# Patient Record
Sex: FEMALE | Race: WHITE | HISPANIC OR LATINO | URBAN - METROPOLITAN AREA
[De-identification: names, ages, dates, MRNs, and addresses within clinical notes are randomized per-mention and may not be internally consistent; named-entity substitution may affect disease eponyms.]

---

## 2018-11-11 ENCOUNTER — INPATIENT (INPATIENT)
Facility: HOSPITAL | Age: 83
LOS: 3 days | Discharge: HOME | DRG: 312 | End: 2018-11-15
Attending: INTERNAL MEDICINE | Admitting: INTERNAL MEDICINE
Payer: MEDICARE

## 2018-11-11 VITALS — WEIGHT: 130.07 LBS | HEIGHT: 58 IN
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
## 2018-11-11 LAB
ALBUMIN SERPL ELPH-MCNC: 3.8 G/DL — SIGNIFICANT CHANGE UP (ref 3.5–5)
ALP SERPL-CCNC: 103 U/L — SIGNIFICANT CHANGE UP (ref 40–120)
ALT FLD-CCNC: 22 U/L DA — SIGNIFICANT CHANGE UP (ref 10–60)
ANION GAP SERPL CALC-SCNC: 10 MMOL/L — SIGNIFICANT CHANGE UP (ref 5–17)
APTT BLD: 27.7 SEC — SIGNIFICANT CHANGE UP (ref 27.5–36.3)
AST SERPL-CCNC: 35 U/L — SIGNIFICANT CHANGE UP (ref 10–40)
BASOPHILS # BLD AUTO: 0.1 K/UL — SIGNIFICANT CHANGE UP (ref 0–0.2)
BASOPHILS NFR BLD AUTO: 0.7 % — SIGNIFICANT CHANGE UP (ref 0–2)
BILIRUB SERPL-MCNC: 0.2 MG/DL — SIGNIFICANT CHANGE UP (ref 0.2–1.2)
BUN SERPL-MCNC: 21 MG/DL — HIGH (ref 7–18)
CALCIUM SERPL-MCNC: 9.4 MG/DL — SIGNIFICANT CHANGE UP (ref 8.4–10.5)
CHLORIDE SERPL-SCNC: 104 MMOL/L — SIGNIFICANT CHANGE UP (ref 96–108)
CK SERPL-CCNC: 267 U/L — HIGH (ref 21–215)
CO2 SERPL-SCNC: 23 MMOL/L — SIGNIFICANT CHANGE UP (ref 22–31)
CREAT SERPL-MCNC: 1.03 MG/DL — SIGNIFICANT CHANGE UP (ref 0.5–1.3)
EOSINOPHIL # BLD AUTO: 0.2 K/UL — SIGNIFICANT CHANGE UP (ref 0–0.5)
EOSINOPHIL NFR BLD AUTO: 2 % — SIGNIFICANT CHANGE UP (ref 0–6)
GLUCOSE SERPL-MCNC: 106 MG/DL — HIGH (ref 70–99)
HCT VFR BLD CALC: 39.1 % — SIGNIFICANT CHANGE UP (ref 34.5–45)
HGB BLD-MCNC: 13.2 G/DL — SIGNIFICANT CHANGE UP (ref 11.5–15.5)
INR BLD: 1.05 RATIO — SIGNIFICANT CHANGE UP (ref 0.88–1.16)
LYMPHOCYTES # BLD AUTO: 1.7 K/UL — SIGNIFICANT CHANGE UP (ref 1–3.3)
LYMPHOCYTES # BLD AUTO: 18.1 % — SIGNIFICANT CHANGE UP (ref 13–44)
MAGNESIUM SERPL-MCNC: 2.4 MG/DL — SIGNIFICANT CHANGE UP (ref 1.6–2.6)
MCHC RBC-ENTMCNC: 30.6 PG — SIGNIFICANT CHANGE UP (ref 27–34)
MCHC RBC-ENTMCNC: 33.8 GM/DL — SIGNIFICANT CHANGE UP (ref 32–36)
MCV RBC AUTO: 90.4 FL — SIGNIFICANT CHANGE UP (ref 80–100)
MONOCYTES # BLD AUTO: 0.8 K/UL — SIGNIFICANT CHANGE UP (ref 0–0.9)
MONOCYTES NFR BLD AUTO: 8.7 % — SIGNIFICANT CHANGE UP (ref 2–14)
NEUTROPHILS # BLD AUTO: 6.6 K/UL — SIGNIFICANT CHANGE UP (ref 1.8–7.4)
NEUTROPHILS NFR BLD AUTO: 70.5 % — SIGNIFICANT CHANGE UP (ref 43–77)
PLATELET # BLD AUTO: 291 K/UL — SIGNIFICANT CHANGE UP (ref 150–400)
POTASSIUM SERPL-MCNC: 4.9 MMOL/L — SIGNIFICANT CHANGE UP (ref 3.5–5.3)
POTASSIUM SERPL-SCNC: 4.9 MMOL/L — SIGNIFICANT CHANGE UP (ref 3.5–5.3)
PROT SERPL-MCNC: 9.3 G/DL — HIGH (ref 6–8.3)
PROTHROM AB SERPL-ACNC: 11.7 SEC — SIGNIFICANT CHANGE UP (ref 10–12.9)
RBC # BLD: 4.33 M/UL — SIGNIFICANT CHANGE UP (ref 3.8–5.2)
RBC # FLD: 12.5 % — SIGNIFICANT CHANGE UP (ref 10.3–14.5)
SODIUM SERPL-SCNC: 137 MMOL/L — SIGNIFICANT CHANGE UP (ref 135–145)
TROPONIN I SERPL-MCNC: <0.015 NG/ML — SIGNIFICANT CHANGE UP (ref 0–0.04)
WBC # BLD: 9.4 K/UL — SIGNIFICANT CHANGE UP (ref 3.8–10.5)
WBC # FLD AUTO: 9.4 K/UL — SIGNIFICANT CHANGE UP (ref 3.8–10.5)

## 2018-11-11 PROCEDURE — 71045 X-RAY EXAM CHEST 1 VIEW: CPT | Mod: 26

## 2018-11-11 PROCEDURE — 72131 CT LUMBAR SPINE W/O DYE: CPT | Mod: 26

## 2018-11-11 PROCEDURE — 72128 CT CHEST SPINE W/O DYE: CPT | Mod: 26

## 2018-11-11 PROCEDURE — 70450 CT HEAD/BRAIN W/O DYE: CPT | Mod: 26

## 2018-11-11 PROCEDURE — 99285 EMERGENCY DEPT VISIT HI MDM: CPT

## 2018-11-11 PROCEDURE — 99223 1ST HOSP IP/OBS HIGH 75: CPT

## 2018-11-11 RX ORDER — IBUPROFEN 200 MG
600 TABLET ORAL ONCE
Qty: 0 | Refills: 0 | Status: COMPLETED | OUTPATIENT
Start: 2018-11-11 | End: 2018-11-11

## 2018-11-11 RX ORDER — KETOROLAC TROMETHAMINE 30 MG/ML
30 SYRINGE (ML) INJECTION ONCE
Qty: 0 | Refills: 0 | Status: DISCONTINUED | OUTPATIENT
Start: 2018-11-11 | End: 2018-11-11

## 2018-11-11 RX ADMIN — Medication 600 MILLIGRAM(S): at 20:48

## 2018-11-11 NOTE — ED PROVIDER NOTE - PHYSICAL EXAMINATION
reproducible midline thoracic and lumbar tenderness to palpation. R paraspinal lumbar and thoracic tenderness to palpation.

## 2018-11-11 NOTE — ED PROVIDER NOTE - CHPI ED SYMPTOMS NEG
no loss of consciousness/no fever, no diaphoresis, no dizziness, no lightheadedness, no changes in vision./no weakness/no vomiting

## 2018-11-11 NOTE — ED PROVIDER NOTE - OBJECTIVE STATEMENT
89 y/o F pt with PMHx of Osteoporosis and HTN, presents to ED s/p fall from standing height 1 day ago. Pt reports that she was walking to throw food out into the garbage when she fell. She remembers the fall and denies feeling lightheaded or dizziness beforehand but is unsure of what caused the fall. Pt denies tripping or any weakness. She was down on the floor for 6-7 minutes at which time pt felt short of breath, which was self limited. Pt denies chest pain, changes in vision, nausea, or vomiting. Pt is only c/o worsening back pain today. She was prompted to come to ED by friends. Pt did not experience any other episodes of shortness of breath until she was in CT scan which was once again self limited. No other acute complaints at this time. Pt is allergic to penicillin.

## 2018-11-11 NOTE — ED ADULT NURSE NOTE - NSIMPLEMENTINTERV_GEN_ALL_ED
Implemented All Universal Safety Interventions:  [place] to call system. Call bell, personal items and telephone within reach. Instruct patient to call for assistance. Room bathroom lighting operational. Non-slip footwear when patient is off stretcher. Physically safe environment: no spills, clutter or unnecessary equipment. Stretcher in lowest position, wheels locked, appropriate side rails in place. Implemented All Fall with Harm Risk Interventions:  [place] to call system. Call bell, personal items and telephone within reach. Instruct patient to call for assistance. Room bathroom lighting operational. Non-slip footwear when patient is off stretcher. Physically safe environment: no spills, clutter or unnecessary equipment. Stretcher in lowest position, wheels locked, appropriate side rails in place. Provide visual cue, wrist band, yellow gown, etc. Monitor gait and stability. Monitor for mental status changes and reorient to person, place, and time. Review medications for side effects contributing to fall risk. Reinforce activity limits and safety measures with patient and family. Provide visual clues: red socks.

## 2018-11-11 NOTE — ED PROVIDER NOTE - MEDICAL DECISION MAKING DETAILS
89 y/o F pt presents s/p fall of unknown mechanism. Will obtain cardiac workup, CT head, neck, thoracic and lumbar spine then reassess.

## 2018-11-11 NOTE — ED STATDOCS - OBJECTIVE STATEMENT
Telemedicine assessment was conducted (using real time 2 way audio-video technology) by Dr. Spenser Spaulding located at [address] [place] [address]  +++++++++++++++++++++++++++++++++++++++++++++++++++  History and Plan: Patient says she was bringing something to the garbage when she suddenly "went down" to the floor. Patient states she is beginning to have right hip pain and mid-lower back pain. Patient says she had difficulty breathing s/p fall. Patient notes a history of problems with her balance. Telemedicine assessment was conducted (using real time 2 way audio-video technology) by Dr. Spenser Spaulding located at [address] [place] [id number]  +++++++++++++++++++++++++++++++++++++++++++++++++++  History and Plan: Patient says she was bringing something to the garbage when she suddenly "went down" to the floor. Patient states she is beginning to have right hip pain and mid-lower back pain. Patient says she had difficulty breathing s/p fall. Patient notes a history of problems with her balance.    dizziness/syncope with fall  ekg, labs, ct    Patient seen by me in intake for initial assessment and ordering. Physician on site to follow results and further evaluate and treat patient.

## 2018-11-11 NOTE — ED PROVIDER NOTE - PROGRESS NOTE DETAILS
Imaging shows 3 compression fractures of indeterminate age. Pt was told of imaging results and believes that these are old fractures due to medical hx. Will admit due to unknown mechanism of fall, consulted with Dr. Barraza.

## 2018-11-12 DIAGNOSIS — Z29.9 ENCOUNTER FOR PROPHYLACTIC MEASURES, UNSPECIFIED: ICD-10-CM

## 2018-11-12 DIAGNOSIS — R07.89 OTHER CHEST PAIN: ICD-10-CM

## 2018-11-12 DIAGNOSIS — M81.0 AGE-RELATED OSTEOPOROSIS WITHOUT CURRENT PATHOLOGICAL FRACTURE: ICD-10-CM

## 2018-11-12 DIAGNOSIS — E78.89 OTHER LIPOPROTEIN METABOLISM DISORDERS: ICD-10-CM

## 2018-11-12 DIAGNOSIS — Z98.890 OTHER SPECIFIED POSTPROCEDURAL STATES: Chronic | ICD-10-CM

## 2018-11-12 DIAGNOSIS — K21.9 GASTRO-ESOPHAGEAL REFLUX DISEASE WITHOUT ESOPHAGITIS: ICD-10-CM

## 2018-11-12 DIAGNOSIS — R55 SYNCOPE AND COLLAPSE: ICD-10-CM
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
## 2018-11-12 DIAGNOSIS — M54.9 DORSALGIA, UNSPECIFIED: ICD-10-CM

## 2018-11-12 DIAGNOSIS — M54.6 PAIN IN THORACIC SPINE: ICD-10-CM

## 2018-11-12 DIAGNOSIS — I10 ESSENTIAL (PRIMARY) HYPERTENSION: ICD-10-CM

## 2018-11-12 LAB
CREATININE, URINE RESULT: 22 MG/DL — SIGNIFICANT CHANGE UP
PROT ?TM UR-MCNC: 4 MG/DL — SIGNIFICANT CHANGE UP (ref 0–12)

## 2018-11-12 PROCEDURE — 99222 1ST HOSP IP/OBS MODERATE 55: CPT

## 2018-11-12 PROCEDURE — 93880 EXTRACRANIAL BILAT STUDY: CPT | Mod: 26

## 2018-11-12 PROCEDURE — 99233 SBSQ HOSP IP/OBS HIGH 50: CPT | Mod: GC

## 2018-11-12 PROCEDURE — 99223 1ST HOSP IP/OBS HIGH 75: CPT

## 2018-11-12 RX ORDER — METOPROLOL TARTRATE 50 MG
12.5 TABLET ORAL
Qty: 0 | Refills: 0 | Status: DISCONTINUED | OUTPATIENT
Start: 2018-11-12 | End: 2018-11-13

## 2018-11-12 RX ORDER — LIDOCAINE 4 G/100G
1 CREAM TOPICAL DAILY
Qty: 0 | Refills: 0 | Status: DISCONTINUED | OUTPATIENT
Start: 2018-11-12 | End: 2018-11-15

## 2018-11-12 RX ORDER — HEPARIN SODIUM 5000 [USP'U]/ML
INJECTION INTRAVENOUS; SUBCUTANEOUS
Qty: 25000 | Refills: 0 | Status: DISCONTINUED | OUTPATIENT
Start: 2018-11-12 | End: 2018-11-13

## 2018-11-12 RX ORDER — AMLODIPINE BESYLATE 2.5 MG/1
5 TABLET ORAL DAILY
Qty: 0 | Refills: 0 | Status: DISCONTINUED | OUTPATIENT
Start: 2018-11-12 | End: 2018-11-15

## 2018-11-12 RX ORDER — AMLODIPINE BESYLATE 2.5 MG/1
1 TABLET ORAL
Qty: 0 | Refills: 0 | COMMUNITY

## 2018-11-12 RX ORDER — LIDOCAINE 4 G/100G
1 CREAM TOPICAL DAILY
Qty: 0 | Refills: 0 | Status: DISCONTINUED | OUTPATIENT
Start: 2018-11-12 | End: 2018-11-12

## 2018-11-12 RX ORDER — ACETAMINOPHEN 500 MG
650 TABLET ORAL ONCE
Qty: 0 | Refills: 0 | Status: COMPLETED | OUTPATIENT
Start: 2018-11-12 | End: 2018-11-12

## 2018-11-12 RX ORDER — ATORVASTATIN CALCIUM 80 MG/1
40 TABLET, FILM COATED ORAL AT BEDTIME
Qty: 0 | Refills: 0 | Status: DISCONTINUED | OUTPATIENT
Start: 2018-11-12 | End: 2018-11-12

## 2018-11-12 RX ORDER — ACETAMINOPHEN 500 MG
650 TABLET ORAL EVERY 6 HOURS
Qty: 0 | Refills: 0 | Status: DISCONTINUED | OUTPATIENT
Start: 2018-11-12 | End: 2018-11-14

## 2018-11-12 RX ORDER — HEPARIN SODIUM 5000 [USP'U]/ML
4500 INJECTION INTRAVENOUS; SUBCUTANEOUS ONCE
Qty: 0 | Refills: 0 | Status: DISCONTINUED | OUTPATIENT
Start: 2018-11-12 | End: 2018-11-12

## 2018-11-12 RX ORDER — HEPARIN SODIUM 5000 [USP'U]/ML
2000 INJECTION INTRAVENOUS; SUBCUTANEOUS EVERY 6 HOURS
Qty: 0 | Refills: 0 | Status: DISCONTINUED | OUTPATIENT
Start: 2018-11-12 | End: 2018-11-12

## 2018-11-12 RX ORDER — PANTOPRAZOLE SODIUM 20 MG/1
40 TABLET, DELAYED RELEASE ORAL
Qty: 0 | Refills: 0 | Status: DISCONTINUED | OUTPATIENT
Start: 2018-11-12 | End: 2018-11-15

## 2018-11-12 RX ORDER — GABAPENTIN 400 MG/1
100 CAPSULE ORAL AT BEDTIME
Qty: 0 | Refills: 0 | Status: DISCONTINUED | OUTPATIENT
Start: 2018-11-12 | End: 2018-11-15

## 2018-11-12 RX ORDER — METOPROLOL TARTRATE 50 MG
25 TABLET ORAL
Qty: 0 | Refills: 0 | Status: DISCONTINUED | OUTPATIENT
Start: 2018-11-12 | End: 2018-11-12

## 2018-11-12 RX ORDER — HEPARIN SODIUM 5000 [USP'U]/ML
4500 INJECTION INTRAVENOUS; SUBCUTANEOUS EVERY 6 HOURS
Qty: 0 | Refills: 0 | Status: DISCONTINUED | OUTPATIENT
Start: 2018-11-12 | End: 2018-11-12

## 2018-11-12 RX ORDER — ENOXAPARIN SODIUM 100 MG/ML
40 INJECTION SUBCUTANEOUS DAILY
Qty: 0 | Refills: 0 | Status: DISCONTINUED | OUTPATIENT
Start: 2018-11-12 | End: 2018-11-12

## 2018-11-12 RX ORDER — ACETAMINOPHEN 500 MG
650 TABLET ORAL EVERY 6 HOURS
Qty: 0 | Refills: 0 | Status: DISCONTINUED | OUTPATIENT
Start: 2018-11-12 | End: 2018-11-12

## 2018-11-12 RX ORDER — HEPARIN SODIUM 5000 [USP'U]/ML
INJECTION INTRAVENOUS; SUBCUTANEOUS
Qty: 25000 | Refills: 0 | Status: DISCONTINUED | OUTPATIENT
Start: 2018-11-12 | End: 2018-11-12

## 2018-11-12 RX ORDER — ASPIRIN/CALCIUM CARB/MAGNESIUM 324 MG
81 TABLET ORAL DAILY
Qty: 0 | Refills: 0 | Status: DISCONTINUED | OUTPATIENT
Start: 2018-11-12 | End: 2018-11-15

## 2018-11-12 RX ADMIN — Medication 650 MILLIGRAM(S): at 23:01

## 2018-11-12 RX ADMIN — Medication 81 MILLIGRAM(S): at 11:17

## 2018-11-12 RX ADMIN — LIDOCAINE 1 PATCH: 4 CREAM TOPICAL at 17:54

## 2018-11-12 RX ADMIN — ENOXAPARIN SODIUM 40 MILLIGRAM(S): 100 INJECTION SUBCUTANEOUS at 11:17

## 2018-11-12 RX ADMIN — PANTOPRAZOLE SODIUM 40 MILLIGRAM(S): 20 TABLET, DELAYED RELEASE ORAL at 05:23

## 2018-11-12 RX ADMIN — Medication 650 MILLIGRAM(S): at 16:40

## 2018-11-12 RX ADMIN — LIDOCAINE 1 PATCH: 4 CREAM TOPICAL at 11:19

## 2018-11-12 RX ADMIN — Medication 25 MILLIGRAM(S): at 05:23

## 2018-11-12 RX ADMIN — Medication 600 MILLIGRAM(S): at 01:20

## 2018-11-12 RX ADMIN — LIDOCAINE 1 PATCH: 4 CREAM TOPICAL at 06:08

## 2018-11-12 RX ADMIN — Medication 650 MILLIGRAM(S): at 16:10

## 2018-11-12 RX ADMIN — Medication 12.5 MILLIGRAM(S): at 17:54

## 2018-11-12 RX ADMIN — AMLODIPINE BESYLATE 5 MILLIGRAM(S): 2.5 TABLET ORAL at 05:23

## 2018-11-12 RX ADMIN — Medication 650 MILLIGRAM(S): at 08:57

## 2018-11-12 RX ADMIN — Medication 650 MILLIGRAM(S): at 09:23

## 2018-11-12 NOTE — CONSULT NOTE ADULT - ASSESSMENT
89 yo F with HTN who presented after a fall     1. Fall:    2. Chest pain: -Patient reports this is similar to her gastritis; she furthermore has negative cardiac enzymes and a normal EKG  -Check echocardiogram  -Consider outpatient stress test if symptoms recur after trial of PPI     3. HTN: Patient is on amlodipine 5 mg  -Was on valsartan at home, which has been changed to metoprolol here  *No indication for metoprolol unless EF is depressed on echocardiogram; would discontinue for now and switch patient to losartan instead of her valsartan (can start at 50mg once daily and increase to 100mg daily as needed)    4. HLD: Patient is out of benefit range of statin given her age, would discontinue    ***Note that this is a preliminary note and any recommendations should NOT be carried out until this note is finalized. *** 88 yo F with HTN who presented after a fall     1. Fall: Sounds mechanical in nature given patient's description. If further episodes, can see cardiologist for re-evaluation.     2. Chest pain: -Patient reports this is similar to her gastritis; she furthermore has negative cardiac enzymes and a normal EKG  -Check echocardiogram  -Consider outpatient stress test if symptoms recur after trial of PPI     3. HTN: Patient is on amlodipine 5 mg  -Was on valsartan at home, which has been changed to metoprolol here  *No indication for metoprolol unless EF is depressed on echocardiogram; would discontinue for now and switch patient to losartan instead of her valsartan (can start at 50mg once daily and increase to 100mg daily as needed)    4. HLD: Patient is out of benefit range of statin given her age, would discontinue

## 2018-11-12 NOTE — H&P ADULT - PROBLEM SELECTOR PLAN 2
atypical chest pain most likely from GERD, intermediate risk for ACS   will r/o ACS  c/w aspirin, statin and BB  EKG as above  troponin 1 negative  f/u t2 t3  f/u echo  f/u cardiology Atypical chest pain most likely from GERD, intermediate risk for ACS   will r/o ACS  c/w aspirin, statin and BB  EKG as above  troponin 1 negative  f/u t2 t3  f/u echo  f/u cardiology

## 2018-11-12 NOTE — H&P ADULT - PROBLEM SELECTOR PLAN 3
Pt is not valsartan 320mg and amlodipine 5mg at home  Will hold the valsartan for now as its not available in the pharmacy   will start metoprolol 12.5mg BID   c/w Amlodipine 5mg OD  c/w dash diet  f/u lipid profile

## 2018-11-12 NOTE — PHYSICAL THERAPY INITIAL EVALUATION ADULT - CRITERIA FOR SKILLED THERAPEUTIC INTERVENTIONS
predicted duration of therapy intervention/risk reduction/prevention/therapy frequency/anticipated equipment needs at discharge/impairments found/functional limitations in following categories/anticipated discharge recommendation/rehab potential

## 2018-11-12 NOTE — PHYSICAL THERAPY INITIAL EVALUATION ADULT - IMPAIRMENTS CONTRIBUTING TO GAIT DEVIATIONS, PT EVAL
decreased strength/decreased ROM/impaired balance/impaired postural control/dizziness which got better but did not completely subside/pain

## 2018-11-12 NOTE — PHYSICAL THERAPY INITIAL EVALUATION ADULT - GENERAL OBSERVATIONS, REHAB EVAL
Consult received, chart reviewed. Patient received seated in chair, NAD, + tele. Patient agreed to EVALUATION from Physical Therapist.

## 2018-11-12 NOTE — PROGRESS NOTE ADULT - PROBLEM SELECTOR PLAN 5
Compression fracture of T9, T10 and L2.  Riddhi on board for pain management  f/u vit D  out pt follow up with PCP for osteoporosis Compression fracture of T9, T10 and L2.  Pain management consultation, appreciated.    f/u vit D  out pt follow up with PCP for osteoporosis

## 2018-11-12 NOTE — H&P ADULT - HISTORY OF PRESENT ILLNESS
89 year old female from home lives alone walks independently with PMHx of HTN, Osteoporosis, Esophagitis presented to ED because of unwitnessed fall after having near syncope episode on november 10th in the morning, pt was in the dinning room and was going to throw the garbage suddenly froze she started feeling dizzy and she lost her balance and fell down on her back, hurt her back (she developed back pain form after the injury), started feeling SOB and the SOB episode lasted for around 5-7 minutes,  no loss of consciousness or head trauma, pt was aware of the entire incident. Pt acknowledges she did not eat anything in the morning. Pt denies feeling numbness, weakness, tingling, chest pain, palpitation, or going for urination or defecation before the episode. Pt states she drinks lots of fluids and she had similar episode one month back but she did not fell down at that time. Pt also states sometimes she feels chest pain around epigastric are which she thinks from esophagitis.    In the ED vitals are stable, EKG: NSR, septal infract age undermined with 78 BPM, Had CBC and BMP grossly unremarkable except for elevated protein. Pt had CT head negative for acute stroke and also had CT lumbar and spine which showed 1) Age indeterminate compression fracture deformities of the T9, T10 and L2 vertebral bodies. 2) Indeterminate exophytic slightly high attenuation left renal midpole lesion which could represent a hemorrhagic cyst versus a renal mass. A nonemergent renal ultrasound is recommended for further evaluation. 89 year old female from home lives alone walks independently with PMHx of HTN, Osteoporosis, Esophagitis presented to ED because of unwitnessed fall after having near syncope episode on november 10th in the morning, pt was in the dinning room and was going to throw the garbage suddenly froze she started feeling dizzy and she lost her balance and fell down on her back, hurt her back (she developed back pain form after the injury), started feeling SOB and the SOB episode lasted for around 5-7 minutes,  no loss of consciousness or head trauma, pt was aware of the entire incident. Pt acknowledges she did not eat anything in the morning. Pt denies feeling numbness, weakness, tingling, chest pain, palpitation, or going for urination or defecation before the episode. Pt states she drinks lots of fluids and she had similar episode one month back but she did not fell down at that time. Pt also states sometimes she feels chest pain around epigastric are which she thinks from esophagitis.    In the ED vitals are stable, EKG: NSR, septal infract age undermined with 78 BPM, Had CBC and BMP grossly unremarkable except for elevated protein. Pt had CT head negative for acute stroke and also had CT lumbar and spine which showed 1) Age indeterminate compression fracture deformities of the T9, T10 and L2 vertebral bodies. 2) Indeterminate exophytic slightly high attenuation left renal midpole lesion which could represent a hemorrhagic cyst versus a renal mass. A nonemergent renal ultrasound is recommended for further evaluation.    Code status: Pt has healthcare proxy : DAKOTA EVANS 89 year old female from home lives alone walks independently with PMHx of HTN, Osteoporosis, Esophagitis presented to ED because of unwitnessed fall after having near syncope episode on november 10th in the morning, pt was in the dinning room and was going to throw the garbage suddenly froze she started feeling dizzy and she lost her balance and fell down on her back, hurt her back (she developed back pain form after the injury, 5/10, sore pain, aggravated by movement), started feeling SOB and the SOB episode lasted for around 5-7 minutes,  no loss of consciousness or head trauma, pt was aware of the entire incident. Pt acknowledges she did not eat anything in the morning. Pt denies feeling numbness, weakness, tingling, chest pain, palpitation, or going for urination or defecation before the episode. Pt states she drinks lots of fluids and she had similar episode one month back but she did not fell down at that time.  Pt also states sometimes she feels chest pain around epigastric region which she thinks from esophagitis, burning type of pain, 7/10 on intensity, non radiating, aggravated by food, not associated with exercise.     In the ED vitals are stable, EKG: NSR, septal infract age undermined with 78 BPM, Had CBC and BMP grossly unremarkable except for elevated protein. Pt had CT head negative for acute stroke and also had CT lumbar and spine which showed 1) Age indeterminate compression fracture deformities of the T9, T10 and L2 vertebral bodies. 2) Indeterminate exophytic slightly high attenuation left renal midpole lesion which could represent a hemorrhagic cyst versus a renal mass. A nonemergent renal ultrasound is recommended for further evaluation.    Code status: Pt has healthcare proxy : DAKOTA EVANS

## 2018-11-12 NOTE — CONSULT NOTE ADULT - PROBLEM SELECTOR RECOMMENDATION 9
+compression fracture deformities of the T9, T10 and L2 vertebral bodies hx of osteoporosis   - tylenol 650 mg q 6 hrs atc   - lidocaine patch daily  - add gabapentin 100 mg at bedtime, will titrate up   - OOB, ambulate

## 2018-11-12 NOTE — PROGRESS NOTE ADULT - PROBLEM SELECTOR PLAN 2
Atypical chest pain most likely from GERD, intermediate risk for ACS   will r/o ACS  c/w aspirin, statin and BB  f/u echo Atypical chest pain most likely from GERD, intermediate risk for ACS   will r/o ACS  c/w aspirin, will discontinue statin and beta blocker  f/u echo

## 2018-11-12 NOTE — CONSULT NOTE ADULT - SUBJECTIVE AND OBJECTIVE BOX
NP Note   Pain Management     89 year old female from home lives alone walks independently with PMHx of HTN, Osteoporosis, Esophagitis presented to ED because of unwitnessed fall after having near syncope episode on november 10th in the morning, pt was in the dinning room and was going to throw the garbage suddenly froze she started feeling dizzy and she lost her balance and fell down on her back, hurt her back (she developed back pain form after the injury, 5/10, sore pain, aggravated by movement), started feeling SOB and the SOB episode lasted for around 5-7 minutes,  no loss of consciousness or head trauma, pt was aware of the entire incident. Pt acknowledges she did not eat anything in the morning. Pt denies feeling numbness, weakness, tingling, chest pain, palpitation, or going for urination or defecation before the episode. Pt states she drinks lots of fluids and she had similar episode one month back but she did not fell down at that time.  Pt also states sometimes she feels chest pain around epigastric region which she thinks from esophagitis, burning type of pain, 7/10 on intensity, non radiating, aggravated by food, not associated with exercise.     In the ED vitals are stable, EKG: NSR, septal infract age undermined with 78 BPM, Had CBC and BMP grossly unremarkable except for elevated protein. Pt had CT head negative for acute stroke and also had CT lumbar and spine which showed 1) Age indeterminate compression fracture deformities of the T9, T10 and L2 vertebral bodies. 2) Indeterminate exophytic slightly high attenuation left renal midpole lesion which could represent a hemorrhagic cyst versus a renal mass. A nonemergent renal ultrasound is recommended for further evaluation.    Patient is 88 y/o female with pmhx of osteoporosis, gastritis, esophagitis and HTN presented sp fall admitted to telemetry for evaluation.  CT scan of thoraci and lumbar spine showed age indeterminate compression fracture deformities of the T9, T10 and L2 vertebral bodies. Patient seen at bedside sitting in chair, eating lunch. Pt co mid back pain. States 2 days ago likely lost balance and fell backwards, landed on her back. States was able to get up and and sit aon the chair. Reports pain developed immediately after fall. reports pain as dull, throbbing, 6/10 in intensity. States took tylenol with relief. Pt reports hx of chronic back pain for the past 2-3 years usually relieved with tylenol. Also reports she is very active and exercises 20 min every day. States was advised not to take NSAIDs because of chronic esophagitis/gastritis. Also stopped bisphosphonates because of side effects.    Pt denies numbness, tingling, focal weakness, bowel or bladder habits changes.             INTERVAL HPI/OVERNIGHT EVENTS: no new complaints    MEDICATIONS  (STANDING):  amLODIPine   Tablet 5 milliGRAM(s) Oral daily  aspirin enteric coated 81 milliGRAM(s) Oral daily  atorvastatin 40 milliGRAM(s) Oral at bedtime  enoxaparin Injectable 40 milliGRAM(s) SubCutaneous daily  lidocaine   Patch 1 Patch Transdermal daily  metoprolol tartrate 12.5 milliGRAM(s) Oral two times a day  pantoprazole    Tablet 40 milliGRAM(s) Oral before breakfast    MEDICATIONS  (PRN):  acetaminophen   Tablet .. 650 milliGRAM(s) Oral every 6 hours PRN Moderate Pain (4 - 6)      __________________________________________________  REVIEW OF SYSTEMS:    CONSTITUTIONAL: No fever,   EYES: no acute visual disturbances  NECK: No pain or stiffness  RESPIRATORY: No cough; No shortness of breath  CARDIOVASCULAR: No chest pain, no palpitations  GASTROINTESTINAL: No pain. No nausea or vomiting; No diarrhea   NEUROLOGICAL: No headache or numbness, no tremors  MUSCULOSKELETAL: No joint pain, no muscle pain  GENITOURINARY: no dysuria, no frequency, no hesitancy  PSYCHIATRY: no depression , no anxiety  ALL OTHER  ROS negative        Vital Signs Last 24 Hrs  T(C): 36.8 (12 Nov 2018 14:24), Max: 36.8 (12 Nov 2018 14:24)  T(F): 98.2 (12 Nov 2018 14:24), Max: 98.2 (12 Nov 2018 14:24)  HR: 70 (12 Nov 2018 14:24) (69 - 88)  BP: 152/61 (12 Nov 2018 14:24) (147/63 - 165/71)  BP(mean): --  RR: 18 (12 Nov 2018 14:24) (17 - 18)  SpO2: 98% (12 Nov 2018 14:24) (95% - 98%)    ________________________________________________  PHYSICAL EXAM:  GENERAL: NAD  HEENT: Normocephalic;  conjunctivae and sclerae clear; moist mucous membranes;   NECK : supple  CHEST/LUNG: Clear to auscultation bilaterally with good air entry   HEART: S1 S2  regular; no murmurs, gallops or rubs  ABDOMEN: Soft, Nontender, Nondistended; Bowel sounds present  EXTREMITIES: no cyanosis; no edema; no calf tenderness  SKIN: warm and dry; no rash  NERVOUS SYSTEM:  Awake and alert; Oriented  to place, person and time ; no new deficits    _________________________________________________  LABS:                        13.2   9.4   )-----------( 291      ( 11 Nov 2018 19:11 )             39.1     11-11    137  |  104  |  21<H>  ----------------------------<  106<H>  4.9   |  23  |  1.03    Ca    9.4      11 Nov 2018 19:11  Mg     2.4     11-11    TPro  9.3<H>  /  Alb  3.8  /  TBili  0.2  /  DBili  x   /  AST  35  /  ALT  22  /  AlkPhos  103  11-11    PT/INR - ( 11 Nov 2018 19:11 )   PT: 11.7 sec;   INR: 1.05 ratio         PTT - ( 11 Nov 2018 19:11 )  PTT:27.7 sec    CAPILLARY BLOOD GLUCOSE            RADIOLOGY & ADDITIONAL TESTS:    Imaging Personally Reviewed:  YES/NO    Consultant(s) Notes Reviewed:   YES/ No    Care Discussed with Consultants :     Plan of care was discussed with patient and /or primary care giver; all questions and concerns were addressed and care was aligned with patient's wishes. NP Note   Pain Management     89 year old female from home lives alone walks independently with PMHx of HTN, Osteoporosis, Esophagitis presented to ED because of unwitnessed fall after having near syncope episode on november 10th in the morning, pt was in the dinning room and was going to throw the garbage suddenly froze she started feeling dizzy and she lost her balance and fell down on her back, hurt her back (she developed back pain form after the injury, 5/10, sore pain, aggravated by movement), started feeling SOB and the SOB episode lasted for around 5-7 minutes,  no loss of consciousness or head trauma, pt was aware of the entire incident. Pt acknowledges she did not eat anything in the morning. Pt denies feeling numbness, weakness, tingling, chest pain, palpitation, or going for urination or defecation before the episode. Pt states she drinks lots of fluids and she had similar episode one month back but she did not fell down at that time.  Pt also states sometimes she feels chest pain around epigastric region which she thinks from esophagitis, burning type of pain, 7/10 on intensity, non radiating, aggravated by food, not associated with exercise.     In the ED vitals are stable, EKG: NSR, septal infract age undermined with 78 BPM, Had CBC and BMP grossly unremarkable except for elevated protein. Pt had CT head negative for acute stroke and also had CT lumbar and spine which showed 1) Age indeterminate compression fracture deformities of the T9, T10 and L2 vertebral bodies. 2) Indeterminate exophytic slightly high attenuation left renal midpole lesion which could represent a hemorrhagic cyst versus a renal mass. A nonemergent renal ultrasound is recommended for further evaluation.    Patient is 89 y/o female with pmhx of osteoporosis, gastritis, esophagitis and HTN presented sp fall admitted to telemetry for evaluation.  CT scan of thoraci and lumbar spine showed age indeterminate compression fracture deformities of the T9, T10 and L2 vertebral bodies. Patient seen at bedside sitting in chair, eating lunch. Pt co mid back pain. States 2 days ago likely lost balance and fell backwards, landed on her back. States was able to get up and and sit aon the chair. Reports pain developed immediately after fall. reports pain as dull, throbbing, 6/10 in intensity. States took tylenol with relief. Pt reports hx of chronic back pain for the past 2-3 years usually relieved with tylenol. Also reports she is very active and exercises 20 min every day. States was advised not to take NSAIDs because of chronic esophagitis/gastritis. Also stopped bisphosphonates because of side effects.    Pt denies numbness, tingling, focal weakness, bowel or bladder habits changes.             INTERVAL HPI/OVERNIGHT EVENTS: no new complaints    MEDICATIONS  (STANDING):  amLODIPine   Tablet 5 milliGRAM(s) Oral daily  aspirin enteric coated 81 milliGRAM(s) Oral daily  atorvastatin 40 milliGRAM(s) Oral at bedtime  enoxaparin Injectable 40 milliGRAM(s) SubCutaneous daily  lidocaine   Patch 1 Patch Transdermal daily  metoprolol tartrate 12.5 milliGRAM(s) Oral two times a day  pantoprazole    Tablet 40 milliGRAM(s) Oral before breakfast    MEDICATIONS  (PRN):  acetaminophen   Tablet .. 650 milliGRAM(s) Oral every 6 hours PRN Moderate Pain (4 - 6)      __________________________________________________  REVIEW OF SYSTEMS:    CONSTITUTIONAL: No fever,   RESPIRATORY: No cough; No shortness of breath  CARDIOVASCULAR: No chest pain, no palpitations  GASTROINTESTINAL: No pain. No nausea or vomiting; No diarrhea   NEUROLOGICAL: No headache or numbness, no tremors  MUSCULOSKELETAL: + chronic lower back pain, + new back pain at thoracic level   GENITOURINARY: no dysuria, no frequency, no hesitancy  PSYCHIATRY: no depression , no anxiety  ALL OTHER  ROS negative        Vital Signs Last 24 Hrs  T(C): 36.8 (12 Nov 2018 14:24), Max: 36.8 (12 Nov 2018 14:24)  T(F): 98.2 (12 Nov 2018 14:24), Max: 98.2 (12 Nov 2018 14:24)  HR: 70 (12 Nov 2018 14:24) (69 - 88)  BP: 152/61 (12 Nov 2018 14:24) (147/63 - 165/71)  BP(mean): --  RR: 18 (12 Nov 2018 14:24) (17 - 18)  SpO2: 98% (12 Nov 2018 14:24) (95% - 98%)    ________________________________________________  PHYSICAL EXAM:  GENERAL: NAD, well appearing   HEENT: Normocephalic;  conjunctivae and sclerae clear; moist mucous membranes;   NECK : supple  CHEST/LUNG: Clear to auscultation   HEART: S1 S2  regular;   ABDOMEN: Soft, Nontender, Nondistended; Bowel sounds present  EXTREMITIES: no cyanosis; no edema; no calf tenderness  MSK: + minimal ttp at lumbar spine region, + mild ttp to thoracic vertebra   SKIN: warm and dry; no rash  NERVOUS SYSTEM:  Awake and alert; Oriented  to place, person and time ; no new deficits    _________________________________________________  LABS:                        13.2   9.4   )-----------( 291      ( 11 Nov 2018 19:11 )             39.1     11-11    137  |  104  |  21<H>  ----------------------------<  106<H>  4.9   |  23  |  1.03    Ca    9.4      11 Nov 2018 19:11  Mg     2.4     11-11    TPro  9.3<H>  /  Alb  3.8  /  TBili  0.2  /  DBili  x   /  AST  35  /  ALT  22  /  AlkPhos  103  11-11    PT/INR - ( 11 Nov 2018 19:11 )   PT: 11.7 sec;   INR: 1.05 ratio         PTT - ( 11 Nov 2018 19:11 )  PTT:27.7 sec    CAPILLARY BLOOD GLUCOSE            RADIOLOGY & ADDITIONAL TESTS:    Imaging Personally Reviewed:  YES/NO    Consultant(s) Notes Reviewed:   YES/ No    Care Discussed with Consultants :     Plan of care was discussed with patient and /or primary care giver; all questions and concerns were addressed and care was aligned with patient's wishes.

## 2018-11-12 NOTE — CONSULT NOTE ADULT - SUBJECTIVE AND OBJECTIVE BOX
CHIEF COMPLAINT: Fall    HPI: 88 yo F with HTN who presented after a fall on the morning of 11/10/2018. Patient     PAST MEDICAL & SURGICAL HISTORY:  As above, also Esophagitis, Osteoporosis, H/O shoulder surgery    Allergies    penicillin (Unknown)    MEDICATIONS  (STANDING):  amLODIPine   Tablet 5 milliGRAM(s) Oral daily  aspirin enteric coated 81 milliGRAM(s) Oral daily  atorvastatin 40 milliGRAM(s) Oral at bedtime  enoxaparin Injectable 40 milliGRAM(s) SubCutaneous daily  lidocaine   Patch 1 Patch Transdermal daily  metoprolol tartrate 12.5 milliGRAM(s) Oral two times a day  pantoprazole    Tablet 40 milliGRAM(s) Oral before breakfast    MEDICATIONS  (PRN):      FAMILY HISTORY:  No pertinent family history    No family history of premature coronary artery disease or sudden cardiac death    SOCIAL HISTORY:  Smoking-  Alcohol-  Illicit Drug use-    REVIEW OF SYSTEMS:  Constitutional: [ ] fever, [ ]weight loss,  [ ]fatigue  Eyes: [ ] visual changes  Respiratory: [ ]shortness of breath;  [ ] cough, [ ]wheezing, [ ]chills, [ ]hemoptysis  Cardiovascular: [ ] chest pain, [ ]palpitations, [ ]dizziness,  [ ]leg swelling [ ]syncope  Gastrointestinal: [ ] abdominal pain, [ ]nausea, [ ]vomiting,  [ ]diarrhea   Genitourinary: [ ] dysuria, [ ] hematuria  Neurologic: [ ] headaches [ ] tremors  [ ] weakness [ ] lightheadedness  Skin: [ ] itching, [ ]burning, [ ] rashes  Endocrine: [ ] heat or cold intolerance  Musculoskeletal: [ ] joint pain or swelling; [ ] muscle, back, or extremity pain  Psychiatric: [ ] depression, [ ]anxiety, [ ]mood swings, or [ ]difficulty sleeping  Hematologic: [ ] easy bruising, [ ] bleeding gums       [ x] All others negative	  [ ] Unable to obtain    Vital Signs Last 24 Hrs  T(C): 36.5 (12 Nov 2018 05:18), Max: 36.7 (11 Nov 2018 21:06)  T(F): 97.7 (12 Nov 2018 05:18), Max: 98 (11 Nov 2018 21:06)  HR: 69 (12 Nov 2018 05:18) (69 - 88)  BP: 154/62 (12 Nov 2018 05:18) (154/62 - 165/71)  BP(mean): --  RR: 18 (12 Nov 2018 05:18) (17 - 18)  SpO2: 95% (12 Nov 2018 05:18) (95% - 98%)  I&O's Summary      PHYSICAL EXAM:  General: No acute distress  HEENT: EOMI, PERRL  Neck: Supple, No JVD  Lungs: Clear to auscultation bilaterally; No rales or wheezing  Heart: Regular rate and rhythm; No murmurs, rubs, or gallops  Abdomen: Nontender, bowel sounds present  Extremities: No clubbing, cyanosis, or edema  Nervous system:  Alert & Oriented X3, no focal deficits  Psychiatric: Normal affect  Skin: No rashes or lesions      LABS:  11-11    137  |  104  |  21<H>  ----------------------------<  106<H>  4.9   |  23  |  1.03    Ca    9.4      11 Nov 2018 19:11  Mg     2.4     11-11    TPro  9.3<H>  /  Alb  3.8  /  TBili  0.2  /  DBili  x   /  AST  35  /  ALT  22  /  AlkPhos  103  11-11    Creatinine Trend: 1.03<--                        13.2   9.4   )-----------( 291      ( 11 Nov 2018 19:11 )             39.1     PT/INR - ( 11 Nov 2018 19:11 )   PT: 11.7 sec;   INR: 1.05 ratio         PTT - ( 11 Nov 2018 19:11 )  PTT:27.7 sec    Cardiac Enzymes: CARDIAC MARKERS ( 11 Nov 2018 19:11 )  <0.015 ng/mL / x     / 267 U/L / x     / x        RADIOLOGY: < from: Xray Chest 1 View AP/PA (11.11.18 @ 18:45) >  IMPRESSION: No evidence for focal infiltrate or lobar consolidation.     < end of copied text >    < from: CT Head No Cont (11.11.18 @ 18:49) >  IMPRESSION:   1. No acute territorial infarct, hemorrhage or mass effect.  2. Mild left parietal scalp soft tissue swelling without evidence of an   underlying calvarial fracture.    < end of copied text >    ECG [my interpretation]: 11/11/2018 @ 20:14: sinus rhythm, normal axis, normal EKG    TELEMETRY:    ECHO: Pending CHIEF COMPLAINT: Fall    HPI: 89 yo F with HTN who presented after a fall on the morning of 11/10/2018. Patient reports she was walking in the kitchen to throw something out when she fell. Denies loss of consciousness. No preceding or subsequent chest pain, dyspnea, palpitations, or LH. No syncopal episodes. She was wearing slippers at the time. Had prior fall in october where she was making the bed and fell against the side of the bed; was wedged between arm-chair and bed so did not fall and was able to push herself back up. Likewise no syncope, chest pain, or dyspnea at that time. Feels well at time of my exam.     PAST MEDICAL & SURGICAL HISTORY:  As above, also Esophagitis, Osteoporosis, H/O shoulder surgery    Allergies    penicillin (Unknown)    MEDICATIONS  (STANDING):  amLODIPine   Tablet 5 milliGRAM(s) Oral daily  aspirin enteric coated 81 milliGRAM(s) Oral daily  atorvastatin 40 milliGRAM(s) Oral at bedtime  enoxaparin Injectable 40 milliGRAM(s) SubCutaneous daily  lidocaine   Patch 1 Patch Transdermal daily  metoprolol tartrate 12.5 milliGRAM(s) Oral two times a day  pantoprazole    Tablet 40 milliGRAM(s) Oral before breakfast    MEDICATIONS  (PRN):      FAMILY HISTORY:  Maternal grandfather: MI at age 61    No family history of premature coronary artery disease or sudden cardiac death    SOCIAL HISTORY:  Smoking-Never  Alcohol-Denies  Illicit Drug use-Denies    REVIEW OF SYSTEMS:  Constitutional: [ ] fever, [ ]weight loss,  [ ]fatigue  Eyes: [ ] visual changes  Respiratory: [ ]shortness of breath;  [ ] cough, [ ]wheezing, [ ]chills, [ ]hemoptysis  Cardiovascular: [ ] chest pain, [ ]palpitations, [ ]dizziness,  [ ]leg swelling [ ]syncope  Gastrointestinal: [ ] abdominal pain, [ ]nausea, [ ]vomiting,  [ ]diarrhea   Genitourinary: [ ] dysuria, [ ] hematuria  Neurologic: [ ] headaches [ ] tremors  [ ] weakness [ ] lightheadedness  Skin: [ ] itching, [ ]burning, [ ] rashes  Endocrine: [ ] heat or cold intolerance  Musculoskeletal: [ ] joint pain or swelling; [ ] muscle, back, or extremity pain  Psychiatric: [ ] depression, [ ]anxiety, [ ]mood swings, or [ ]difficulty sleeping  Hematologic: [ ] easy bruising, [ ] bleeding gums       [ x] All others negative	  [ ] Unable to obtain    Vital Signs Last 24 Hrs  T(C): 36.5 (12 Nov 2018 05:18), Max: 36.7 (11 Nov 2018 21:06)  T(F): 97.7 (12 Nov 2018 05:18), Max: 98 (11 Nov 2018 21:06)  HR: 69 (12 Nov 2018 05:18) (69 - 88)  BP: 154/62 (12 Nov 2018 05:18) (154/62 - 165/71)  BP(mean): --  RR: 18 (12 Nov 2018 05:18) (17 - 18)  SpO2: 95% (12 Nov 2018 05:18) (95% - 98%)  I&O's Summary      PHYSICAL EXAM:  General: No acute distress  HEENT: EOMI, PERRL  Neck: Supple, No JVD  Lungs: Clear to auscultation bilaterally; No rales or wheezing  Heart: Regular rate and rhythm; No murmurs, rubs, or gallops  Abdomen: Nontender, bowel sounds present  Extremities: No clubbing, cyanosis, or edema  Nervous system:  Alert & Oriented X3, no focal deficits  Psychiatric: Normal affect  Skin: No rashes or lesions      LABS:  11-11    137  |  104  |  21<H>  ----------------------------<  106<H>  4.9   |  23  |  1.03    Ca    9.4      11 Nov 2018 19:11  Mg     2.4     11-11    TPro  9.3<H>  /  Alb  3.8  /  TBili  0.2  /  DBili  x   /  AST  35  /  ALT  22  /  AlkPhos  103  11-11    Creatinine Trend: 1.03<--                        13.2   9.4   )-----------( 291      ( 11 Nov 2018 19:11 )             39.1     PT/INR - ( 11 Nov 2018 19:11 )   PT: 11.7 sec;   INR: 1.05 ratio         PTT - ( 11 Nov 2018 19:11 )  PTT:27.7 sec    Cardiac Enzymes: CARDIAC MARKERS ( 11 Nov 2018 19:11 )  <0.015 ng/mL / x     / 267 U/L / x     / x        RADIOLOGY: < from: Xray Chest 1 View AP/PA (11.11.18 @ 18:45) >  IMPRESSION: No evidence for focal infiltrate or lobar consolidation.     < end of copied text >    < from: CT Head No Cont (11.11.18 @ 18:49) >  IMPRESSION:   1. No acute territorial infarct, hemorrhage or mass effect.  2. Mild left parietal scalp soft tissue swelling without evidence of an   underlying calvarial fracture.    < end of copied text >    ECG [my interpretation]: 11/11/2018 @ 20:14: sinus rhythm, normal axis, normal EKG    TELEMETRY: Sinus rhythm, occasional PVCs, bradycardia    ECHO: Pending

## 2018-11-12 NOTE — H&P ADULT - PROBLEM SELECTOR PLAN 1
Could be from age related decreased reflex or vasovegal,   wells socre for PE is 0, low probability of PE   Vitals stable  EKG as above  CT head negative for Acute stroke   T1 negative, f/u T2, T3  F/u echo  F/u carotid doppler   f/u vit b12, folate, tsh, vit D  Primary team please consult cardiologist

## 2018-11-12 NOTE — H&P ADULT - PROBLEM SELECTOR PLAN 5
f/u vit D  out pt follow up with PCP Compression fracture of T9, T10 and L2.  c/w pain management for compression fracture   CT thoracic and lumbar spine as above  f/u vit D  out pt follow up with PCP for osteoporosis

## 2018-11-12 NOTE — H&P ADULT - ASSESSMENT
89 year old [id number] with PMHx HTN, osteoporosis and esophagitis presented to hospital falling after a near syncope episode and chest pain will admit her for syncope work up and ACS r/o.   Orthostatic blood pressure negative. (Lying 157/73 with hr 75/min, sitting 155/81 with hr 82/min, standing 156/74 with hr 80/min)  EKG: Hr 78/min, NSR, No axis deviation, OK interval 116, QRS 66, , T wave inversion in v1.

## 2018-11-12 NOTE — PROGRESS NOTE ADULT - PROBLEM SELECTOR PLAN 3
will start metoprolol 12.5mg BID   c/w Amlodipine 5mg OD  c/w dash diet  f/u lipid profile c/w Amlodipine 5mg OD  c/w dash diet  f/u lipid profile

## 2018-11-12 NOTE — CHART NOTE - NSCHARTNOTEFT_GEN_A_CORE
Pt is refusing all labs, iv line. She is complaining of SOB and D dimer is elevated. Pt is refusing all labs, iv line. She is complaining of SOB and D dimer is ordered. We will start her on heparin and follow up with ct angiogram of chest or vq scan  if she agrees. Pt is refusing all labs, iv line. She is complaining of SOB and D dimer is ordered. I was at bedside and explained to her regarding the risks and benefits of ct angiogram of chest and d dimer. The pt refused all the tests. She already had ct scan today and tired of having all these tests. She is willing to get one ct scan tomorrow in AM. I started her on heparin and will keep her on npo.

## 2018-11-12 NOTE — H&P ADULT - PROBLEM SELECTOR PLAN 6
Elevate total protein with normal albumin and pt also has back pain and compression fracture,  no anemia, CKD, hypercalcemia or osteolytic lesion   Will get serum and protein electrophoresis and will r/o viral infection

## 2018-11-12 NOTE — PROGRESS NOTE ADULT - ATTENDING COMMENTS
Patient was interviewed and examined at the bedside and discussed with Dr. Mccormick.     She was admitted with a c/o near syncope.  She now c/o SOB on standing up.     Alert, 89 yo woman  Vital Signs Last 24 Hrs  T(C): 36.8 (12 Nov 2018 14:24), Max: 36.8 (12 Nov 2018 14:24)  T(F): 98.2 (12 Nov 2018 14:24), Max: 98.2 (12 Nov 2018 14:24)  HR: 70 (12 Nov 2018 14:24) (69 - 88)  BP: 152/61 (12 Nov 2018 14:24) (147/63 - 165/71)  BP(mean): --  RR: 18 (12 Nov 2018 14:24) (17 - 18)  SpO2: 98% (12 Nov 2018 14:24) (95% - 98%)  Lungs, clear  Cor, RRR  Abdomen, soft  Neurological, intact                        13.2   9.4   )-----------( 291      ( 11 Nov 2018 19:11 )             39.1     11-11    137  |  104  |  21<H>  ----------------------------<  106<H>  4.9   |  23  |  1.03    Ca    9.4      11 Nov 2018 19:11  Mg     2.4     11-11    TPro  9.3<H>  /  Alb  3.8  /  TBili  0.2  /  DBili  x   /  AST  35  /  ALT  22  /  AlkPhos  103  11-11        PT/INR - ( 11 Nov 2018 19:11 )   PT: 11.7 sec;   INR: 1.05 ratio      PTT - ( 11 Nov 2018 19:11 )  PTT:27.7 sec    CARDIAC MARKERS ( 11 Nov 2018 19:11 )  <0.015 ng/mL / x     / 267 U/L / x     / x        < from: Xray Chest 1 View AP/PA (11.11.18 @ 18:45) >    PRESSION: No evidence for focal infiltrate or lobar consolidation.     < end of copied text >    < from: CT Lumbar Spine No Cont (11.11.18 @ 18:46) >    1. Age indeterminate compression fracture deformities of the T9, T10 and   L2 vertebral bodies.  2. Indeterminate exophytic slightly high attenuation left renal midpole   lesion which could represent a hemorrhagic cyst versus a renal mass. A   nonemergent renal ultrasound is recommended for further evaluation.    < end of copied text >    IMP: Near syncope.  No evidence of ACS.  SOB, possible PE.  Patient declines blood draw today.          Compression fracture T9, T10, L2, likely chronic         Possible left renal mass  Plan: Heparin, d-dimer, renal ultrasound, CT angio or V-Q scan, if SOB persists.

## 2018-11-12 NOTE — PROGRESS NOTE ADULT - PROBLEM SELECTOR PLAN 1
Could be from age related decreased reflex or vasovagal.  F/u echo  F/u carotid doppler   f/u vit b12, folate, tsh, vit D  Dr Urrutia is on board

## 2018-11-12 NOTE — PHYSICAL THERAPY INITIAL EVALUATION ADULT - ACTIVE RANGE OF MOTION EXAMINATION, REHAB EVAL
bilateral upper extremity Active ROM was WFL (within functional limits)/bilateral  lower extremity Active ROM was WFL (within functional limits)/Except R shoulder flexion limited to ~1/2 range Except R shoulder flexion limited to ~1/2 range secondary to old sx procedure/bilateral upper extremity Active ROM was WFL (within functional limits)/bilateral  lower extremity Active ROM was WFL (within functional limits)

## 2018-11-12 NOTE — H&P ADULT - ATTENDING COMMENTS
Patient seen and examined ; case was discussed with the admitting resident    ROS: as in the HPI; all other ROS negative    SH and family history as above    Vital Signs Last 24 Hrs  T(C): 36.5 (12 Nov 2018 05:18), Max: 36.7 (11 Nov 2018 21:06)  T(F): 97.7 (12 Nov 2018 05:18), Max: 98 (11 Nov 2018 21:06)  HR: 69 (12 Nov 2018 05:18) (69 - 88)  BP: 154/62 (12 Nov 2018 05:18) (154/62 - 165/71)  BP(mean): --  RR: 18 (12 Nov 2018 05:18) (17 - 18)  SpO2: 95% (12 Nov 2018 05:18) (95% - 98%)    GEN: NAD, appears younger than stated age   HEENT- normocephalic; mouth moist  CVS- S1S2+  LUNGS- clear to auscultation; no wheezing  ABD: Soft , nontender, nondistended, Bowel sounds are present  EXTREMITY: no calf tenderness, no cyanosis, no edema  NEURO: AAOx3; non focal neurologic exam; cranial nerves grossly intact  PSYCH: normal affect and behavior  BACK: no swelling or mass;   VASCULAR: ++ distal peripheral pulses  SKIN: warm and dry.       Labs Reviewed:                         13.2   9.4   )-----------( 291      ( 11 Nov 2018 19:11 )             39.1     11-11    137  |  104  |  21<H>  ----------------------------<  106<H>  4.9   |  23  |  1.03    Ca    9.4      11 Nov 2018 19:11  Mg     2.4     11-11    TPro  9.3<H>  /  Alb  3.8  /  TBili  0.2  /  DBili  x   /  AST  35  /  ALT  22  /  AlkPhos  103  11-11    CARDIAC MARKERS ( 11 Nov 2018 19:11 )  <0.015 ng/mL / x     / 267 U/L / x     / x            PT/INR - ( 11 Nov 2018 19:11 )   PT: 11.7 sec;   INR: 1.05 ratio         PTT - ( 11 Nov 2018 19:11 )  PTT:27.7 sec  BNP:   MEDICATIONS  (STANDING):  amLODIPine   Tablet 5 milliGRAM(s) Oral daily  aspirin enteric coated 81 milliGRAM(s) Oral daily  atorvastatin 40 milliGRAM(s) Oral at bedtime  enoxaparin Injectable 40 milliGRAM(s) SubCutaneous daily  lidocaine   Patch 1 Patch Transdermal daily  metoprolol tartrate 25 milliGRAM(s) Oral two times a day  pantoprazole    Tablet 40 milliGRAM(s) Oral before breakfast    CXR reviewed    EKG Reviewed- NSR      88 y/o F with h/o esophagitis, osteoporosis, OA admitted with near syncope and chest pain.    1.Near syncope- EKG showing normal intervals, no ischemic changes. CT head showing no acute process, chronic microvascular changes. Orthostatics negative. ACS r/o pending.   2.Typical chest pain with atypical features (at rest) with associated shortness of breath- Chest pain and dyspnea now resolved. Initial troponin negative, EKG showing some TWI, no previous for comparison. CXR without acute findings. Low Wells score. Patient at moderate risk for ACS, will get echo, monitor telemetry, start BB, ASA, and statin and request cardiology evaluation.   3. Protein gap- will check SPEP, eGFR in CKDIII range with unknown baseline, bone pain with compression fractures, no other CRAB features.   4.Osteoporosis with compression fractures (age indeterminate at T9,10 and L2) POA- Pain control, recommend tx as an outpatient, can consider Raloxifene vs bone rebuilding agent if patient open to treatment, she reports a h/o osteonecrosis and stopped bisphosphonates.   5.H/o esophagitis – cont protonix   6.Medical noncompliance- patient refusing multiple interventions, including IV. Patient counseled.         Plan of care discussed with patient ;  all questions and concerns were addressed. Patient seen and examined 11/11/18 ; case was discussed with the admitting resident    ROS: as in the HPI; all other ROS negative    SH and family history as above    Vital Signs Last 24 Hrs  T(C): 36.5 (12 Nov 2018 05:18), Max: 36.7 (11 Nov 2018 21:06)  T(F): 97.7 (12 Nov 2018 05:18), Max: 98 (11 Nov 2018 21:06)  HR: 69 (12 Nov 2018 05:18) (69 - 88)  BP: 154/62 (12 Nov 2018 05:18) (154/62 - 165/71)  BP(mean): --  RR: 18 (12 Nov 2018 05:18) (17 - 18)  SpO2: 95% (12 Nov 2018 05:18) (95% - 98%)    GEN: NAD, appears younger than stated age   HEENT- normocephalic; mouth moist  CVS- S1S2+  LUNGS- clear to auscultation; no wheezing  ABD: Soft , nontender, nondistended, Bowel sounds are present  EXTREMITY: no calf tenderness, no cyanosis, no edema  NEURO: AAOx3; non focal neurologic exam; cranial nerves grossly intact  PSYCH: normal affect and behavior  BACK: no swelling or mass;   VASCULAR: ++ distal peripheral pulses  SKIN: warm and dry.       Labs Reviewed:                         13.2   9.4   )-----------( 291      ( 11 Nov 2018 19:11 )             39.1     11-11    137  |  104  |  21<H>  ----------------------------<  106<H>  4.9   |  23  |  1.03    Ca    9.4      11 Nov 2018 19:11  Mg     2.4     11-11    TPro  9.3<H>  /  Alb  3.8  /  TBili  0.2  /  DBili  x   /  AST  35  /  ALT  22  /  AlkPhos  103  11-11    CARDIAC MARKERS ( 11 Nov 2018 19:11 )  <0.015 ng/mL / x     / 267 U/L / x     / x            PT/INR - ( 11 Nov 2018 19:11 )   PT: 11.7 sec;   INR: 1.05 ratio         PTT - ( 11 Nov 2018 19:11 )  PTT:27.7 sec  BNP:   MEDICATIONS  (STANDING):  amLODIPine   Tablet 5 milliGRAM(s) Oral daily  aspirin enteric coated 81 milliGRAM(s) Oral daily  atorvastatin 40 milliGRAM(s) Oral at bedtime  enoxaparin Injectable 40 milliGRAM(s) SubCutaneous daily  lidocaine   Patch 1 Patch Transdermal daily  metoprolol tartrate 25 milliGRAM(s) Oral two times a day  pantoprazole    Tablet 40 milliGRAM(s) Oral before breakfast    CXR reviewed    EKG Reviewed- NSR      88 y/o F with h/o esophagitis, osteoporosis, OA admitted with near syncope and chest pain.    1.Near syncope- EKG showing normal intervals, no ischemic changes. CT head showing no acute process, chronic microvascular changes. Orthostatics negative. ACS r/o pending.   2.Typical chest pain with atypical features (at rest) with associated shortness of breath- Chest pain and dyspnea now resolved. Initial troponin negative, EKG showing some TWI, no previous for comparison. CXR without acute findings. Low Wells score. Patient at moderate risk for ACS, will get echo, monitor telemetry, start BB, ASA, and statin and request cardiology evaluation.   3. Protein gap- will check SPEP, eGFR in CKDIII range with unknown baseline, bone pain with compression fractures, no other CRAB features.   4.Osteoporosis with compression fractures (age indeterminate at T9,10 and L2) POA- Pain control, recommend tx as an outpatient, can consider Raloxifene vs bone rebuilding agent if patient open to treatment, she reports a h/o osteonecrosis and stopped bisphosphonates.   5.H/o esophagitis – cont protonix   6.Medical noncompliance- patient refusing multiple interventions, including IV. Patient counseled.         Plan of care discussed with patient ;  all questions and concerns were addressed.

## 2018-11-13 DIAGNOSIS — R06.02 SHORTNESS OF BREATH: ICD-10-CM

## 2018-11-13 DIAGNOSIS — R06.00 DYSPNEA, UNSPECIFIED: ICD-10-CM

## 2018-11-13 PROCEDURE — 71275 CT ANGIOGRAPHY CHEST: CPT | Mod: 26

## 2018-11-13 PROCEDURE — 99233 SBSQ HOSP IP/OBS HIGH 50: CPT | Mod: GC

## 2018-11-13 PROCEDURE — 99233 SBSQ HOSP IP/OBS HIGH 50: CPT

## 2018-11-13 PROCEDURE — 99222 1ST HOSP IP/OBS MODERATE 55: CPT

## 2018-11-13 PROCEDURE — 76770 US EXAM ABDO BACK WALL COMP: CPT | Mod: 26

## 2018-11-13 RX ORDER — LOSARTAN POTASSIUM 100 MG/1
50 TABLET, FILM COATED ORAL DAILY
Qty: 0 | Refills: 0 | Status: DISCONTINUED | OUTPATIENT
Start: 2018-11-13 | End: 2018-11-15

## 2018-11-13 RX ORDER — POLYETHYLENE GLYCOL 3350 17 G/17G
17 POWDER, FOR SOLUTION ORAL ONCE
Qty: 0 | Refills: 0 | Status: COMPLETED | OUTPATIENT
Start: 2018-11-13 | End: 2018-11-13

## 2018-11-13 RX ORDER — LACTULOSE 10 G/15ML
15 SOLUTION ORAL ONCE
Qty: 0 | Refills: 0 | Status: COMPLETED | OUTPATIENT
Start: 2018-11-13 | End: 2018-11-13

## 2018-11-13 RX ORDER — SENNA PLUS 8.6 MG/1
2 TABLET ORAL AT BEDTIME
Qty: 0 | Refills: 0 | Status: DISCONTINUED | OUTPATIENT
Start: 2018-11-13 | End: 2018-11-15

## 2018-11-13 RX ORDER — DOCUSATE SODIUM 100 MG
100 CAPSULE ORAL
Qty: 0 | Refills: 0 | Status: DISCONTINUED | OUTPATIENT
Start: 2018-11-13 | End: 2018-11-15

## 2018-11-13 RX ADMIN — Medication 650 MILLIGRAM(S): at 00:00

## 2018-11-13 RX ADMIN — POLYETHYLENE GLYCOL 3350 17 GRAM(S): 17 POWDER, FOR SOLUTION ORAL at 21:37

## 2018-11-13 RX ADMIN — LIDOCAINE 1 PATCH: 4 CREAM TOPICAL at 17:10

## 2018-11-13 RX ADMIN — Medication 100 MILLIGRAM(S): at 17:09

## 2018-11-13 RX ADMIN — AMLODIPINE BESYLATE 5 MILLIGRAM(S): 2.5 TABLET ORAL at 05:36

## 2018-11-13 RX ADMIN — LIDOCAINE 1 PATCH: 4 CREAM TOPICAL at 11:36

## 2018-11-13 RX ADMIN — LACTULOSE 15 GRAM(S): 10 SOLUTION ORAL at 17:09

## 2018-11-13 RX ADMIN — PANTOPRAZOLE SODIUM 40 MILLIGRAM(S): 20 TABLET, DELAYED RELEASE ORAL at 05:36

## 2018-11-13 RX ADMIN — Medication 81 MILLIGRAM(S): at 11:37

## 2018-11-13 RX ADMIN — Medication 650 MILLIGRAM(S): at 06:46

## 2018-11-13 RX ADMIN — SENNA PLUS 2 TABLET(S): 8.6 TABLET ORAL at 20:34

## 2018-11-13 RX ADMIN — Medication 650 MILLIGRAM(S): at 12:59

## 2018-11-13 RX ADMIN — Medication 650 MILLIGRAM(S): at 11:35

## 2018-11-13 RX ADMIN — Medication 12.5 MILLIGRAM(S): at 05:36

## 2018-11-13 RX ADMIN — LIDOCAINE 1 PATCH: 4 CREAM TOPICAL at 23:30

## 2018-11-13 RX ADMIN — Medication 650 MILLIGRAM(S): at 05:06

## 2018-11-13 NOTE — PROGRESS NOTE ADULT - PROBLEM SELECTOR PLAN 1
with age indeterminate compression fracture deformities of the T9, T10 and L2 vertebral bodies sp fall   - c/w acetaminophen 650 atc q 6 hrs   - lidocaine patch daily  - pt refusing to try any other agents including gabapentin 100mg due to potential side effects   - OOB

## 2018-11-13 NOTE — PROGRESS NOTE ADULT - PROBLEM SELECTOR PLAN 2
Could be from age related decreased reflex or vasovagal.  echo showed Grade ii diastolic dysfunction and EF 70%  carotid doppler is negative for DVT  Dr Urrutia is on board

## 2018-11-13 NOTE — PROGRESS NOTE ADULT - PROBLEM SELECTOR PLAN 1
Pt is complaining of shortness of breath  F/U ct angiogram of chest to r/o Pulmonary embolism.  u/s kidney showed bilateral cysts.

## 2018-11-13 NOTE — CONSULT NOTE ADULT - SUBJECTIVE AND OBJECTIVE BOX
Source: Patient and chart    Chief Complaint: s/p fall, back pain    HPI:  89 year old female from home lives alone walks independently with PMHx of HTN, Osteoporosis, Esophagitis presented to ED because of unwitnessed fall after having near syncope episode on november 10th in the morning, pt was in the dinning room and was going to throw the garbage suddenly froze she started feeling dizzy and she lost her balance and fell down on her back, hurt her back (she developed back pain form after the injury, 5/10, sore pain, aggravated by movement), started feeling SOB and the SOB episode lasted for around 5-7 minutes,  no loss of consciousness or head trauma, pt was aware of the entire incident. Pt acknowledges she did not eat anything in the morning. Pt denies feeling numbness, weakness, tingling, chest pain, palpitation, or going for urination or defecation before the episode. Pt states she drinks lots of fluids and she had similar episode one month back but she did not fell down at that time.  Pt also states sometimes she feels chest pain around epigastric region which she thinks from esophagitis, burning type of pain, 7/10 on intensity, non radiating, aggravated by food, not associated with exercise.     The patient is originally from [place]. 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 She has notice that she is more steady. She denies any chronic dyspnea and is able to do her own ADLs including shopping. She denies any coughing, orthopnea, PND, or leg swelling. She is a lifelong nonsmoker and no occupational exposure.    MEDICATIONS  (STANDING):  acetaminophen   Tablet .. 650 milliGRAM(s) Oral every 6 hours  amLODIPine   Tablet 5 milliGRAM(s) Oral daily  aspirin enteric coated 81 milliGRAM(s) Oral daily  docusate sodium 100 milliGRAM(s) Oral two times a day  gabapentin 100 milliGRAM(s) Oral at bedtime  lidocaine   Patch 1 Patch Transdermal daily  losartan 50 milliGRAM(s) Oral daily  pantoprazole    Tablet 40 milliGRAM(s) Oral before breakfast  senna 2 Tablet(s) Oral at bedtime    MEDICATIONS  (PRN):      Allergies    penicillin (Unknown)    Intolerances    PAST MEDICAL & SURGICAL HISTORY:  Esophagitis  Osteoporosis  HTN (hypertension)  H/O shoulder surgery    FAMILY HISTORY:  No pertinent family history    SOCIAL HISTORY  Smoking History:   Alcohol:  Drugs:  Occupation:    T(C): 36.9 (11-13-18 @ 14:20), Max: 36.9 (11-13-18 @ 14:20)  HR: 80 (11-13-18 @ 14:20) (68 - 81)  BP: 149/69 (11-13-18 @ 14:20) (149/69 - 175/74)  RR: 17 (11-13-18 @ 14:20) (16 - 18)  SpO2: 99% (11-13-18 @ 14:20) (96% - 99%)    LABS:                        13.2   9.4   )-----------( 291      ( 11 Nov 2018 19:11 )             39.1     11-11    137  |  104  |  21<H>  ----------------------------<  106<H>  4.9   |  23  |  1.03    Ca    9.4      11 Nov 2018 19:11  Mg     2.4     11-11    TPro  9.3<H>  /  Alb  3.8  /  TBili  0.2  /  DBili  x   /  AST  35  /  ALT  22  /  AlkPhos  103  11-11    PT/INR - ( 11 Nov 2018 19:11 )   PT: 11.7 sec;   INR: 1.05 ratio         PTT - ( 11 Nov 2018 19:11 )  PTT:27.7 sec      CARDIAC MARKERS ( 11 Nov 2018 19:11 )  <0.015 ng/mL / x     / 267 U/L / x     / x        Echo-Reviewed. Grade 2 diastolic dysfunction. Elevated PA pressure    Microbiology    RADIOLOGY & ADDITIONAL STUDIES: (My Reading)  CXR- No acute infiltrates  CT angio- No infiltrates. Scattered cysts RUL. No PE

## 2018-11-13 NOTE — PROGRESS NOTE ADULT - ATTENDING COMMENTS
Patient was examined today and discussed with Dr. Mccormick.     She c/o of SOB and dizziness, and has agreed to have CT angio.     IMP:  Pulmonary hypertension may be causing her symptoms.  R/o PE  Plan:  CT angio.  Pulmonary consultation.  Other w/u, as patient allows.

## 2018-11-13 NOTE — CONSULT NOTE ADULT - ASSESSMENT
1) S/P Fall  2) Dizziness  3) Abnormal Echocardiogram    Clinically stable  Currently she denies any dyspnea, CT Angio of the chest is negative for PE and for any evidence of parenchymal lung disease  Echocardiogram has some features of diastolic dysfunction, and given her age and poorly controlled systolic htn, would optimize the patient for possible diastolic dysfunction. There are no features of cor pulmonary clinically and given her age , it is unlikely primary pulmonary htn.   Would not recommend any further workup from a pulmonary standpoint. She has no hx to suggest sleep disordered breathing. No evidence of structural lung disease or hypoxia.  She may benefit from OT and home equipment including a cane and shower stool given her unsteadiness at home.

## 2018-11-13 NOTE — PROGRESS NOTE ADULT - PROBLEM SELECTOR PLAN 3
Atypical chest pain most likely from GERD, intermediate risk for ACS   c/w aspirin, will discontinue statin and beta blocker

## 2018-11-14 DIAGNOSIS — K59.00 CONSTIPATION, UNSPECIFIED: ICD-10-CM

## 2018-11-14 PROCEDURE — 99232 SBSQ HOSP IP/OBS MODERATE 35: CPT

## 2018-11-14 PROCEDURE — 99233 SBSQ HOSP IP/OBS HIGH 50: CPT | Mod: GC

## 2018-11-14 RX ORDER — PANTOPRAZOLE SODIUM 20 MG/1
1 TABLET, DELAYED RELEASE ORAL
Qty: 10 | Refills: 0 | OUTPATIENT
Start: 2018-11-14 | End: 2018-11-23

## 2018-11-14 RX ORDER — POLYETHYLENE GLYCOL 3350 17 G/17G
17 POWDER, FOR SOLUTION ORAL
Qty: 0 | Refills: 0 | Status: DISCONTINUED | OUTPATIENT
Start: 2018-11-14 | End: 2018-11-15

## 2018-11-14 RX ORDER — GABAPENTIN 400 MG/1
1 CAPSULE ORAL
Qty: 30 | Refills: 0 | OUTPATIENT
Start: 2018-11-14 | End: 2018-12-13

## 2018-11-14 RX ORDER — LIDOCAINE 4 G/100G
1 CREAM TOPICAL
Qty: 30 | Refills: 0 | OUTPATIENT
Start: 2018-11-14 | End: 2018-12-13

## 2018-11-14 RX ORDER — LOSARTAN POTASSIUM 100 MG/1
1 TABLET, FILM COATED ORAL
Qty: 30 | Refills: 0 | OUTPATIENT
Start: 2018-11-14 | End: 2018-12-13

## 2018-11-14 RX ORDER — ACETAMINOPHEN 500 MG
650 TABLET ORAL EVERY 6 HOURS
Qty: 0 | Refills: 0 | Status: DISCONTINUED | OUTPATIENT
Start: 2018-11-14 | End: 2018-11-15

## 2018-11-14 RX ORDER — ASPIRIN/CALCIUM CARB/MAGNESIUM 324 MG
1 TABLET ORAL
Qty: 0 | Refills: 0 | COMMUNITY
Start: 2018-11-14

## 2018-11-14 RX ORDER — VALSARTAN 80 MG/1
1 TABLET ORAL
Qty: 0 | Refills: 0 | COMMUNITY

## 2018-11-14 RX ADMIN — LIDOCAINE 1 PATCH: 4 CREAM TOPICAL at 12:08

## 2018-11-14 RX ADMIN — AMLODIPINE BESYLATE 5 MILLIGRAM(S): 2.5 TABLET ORAL at 05:23

## 2018-11-14 RX ADMIN — LOSARTAN POTASSIUM 50 MILLIGRAM(S): 100 TABLET, FILM COATED ORAL at 05:23

## 2018-11-14 RX ADMIN — Medication 650 MILLIGRAM(S): at 02:00

## 2018-11-14 RX ADMIN — Medication 650 MILLIGRAM(S): at 21:08

## 2018-11-14 RX ADMIN — LIDOCAINE 1 PATCH: 4 CREAM TOPICAL at 17:48

## 2018-11-14 RX ADMIN — Medication 100 MILLIGRAM(S): at 05:24

## 2018-11-14 RX ADMIN — Medication 100 MILLIGRAM(S): at 17:49

## 2018-11-14 RX ADMIN — Medication 650 MILLIGRAM(S): at 01:08

## 2018-11-14 RX ADMIN — Medication 650 MILLIGRAM(S): at 13:07

## 2018-11-14 RX ADMIN — Medication 650 MILLIGRAM(S): at 22:00

## 2018-11-14 RX ADMIN — Medication 650 MILLIGRAM(S): at 12:09

## 2018-11-14 NOTE — DISCHARGE NOTE ADULT - PLAN OF CARE
Prevention of falls You came here with  a fall and ct scan showed vertebral compression fracture. Pain was managed with tylenol, lidocaine and she is refusing gabapentin due to side effects. Follow up with your Primary medical doctor and continue the same medication regimen. You came here with  a fall and ct scan showed vertebral compression fracture. Pain was managed with tylenol, lidocaine and she is refusing gabapentin due to side effects. You have a history of hypertension. Your blood pressure has been controlled. Continue with your medications as prescribed. You are advised to eat DASH diet. Please monitor your blood pressure daily, record it and take it to your primary doctor. Follow up with your Primary medical doctor. You complained of shortness of breath and ct angiogram was negative for Pulmonary embolism. Cardiology work up was negative. EKG showed NSR. Troponins were negative. ECHO was normal with EF > 55%. Follow up with your Primary medical doctor.

## 2018-11-14 NOTE — DISCHARGE NOTE ADULT - HOSPITAL COURSE
89 year old female from home lives alone walks independently with PMHx of HTN, Osteoporosis, Esophagitis presented to ED because of unwitnessed fall after having near syncope episode on november 10th in the morning, pt was in the dinning room and was going to throw the garbage suddenly froze she started feeling dizzy and she lost her balance and fell down on her back, hurt her back (she developed back pain form after the injury, 5/10, sore pain, aggravated by movement), started feeling SOB and the SOB episode lasted for around 5-7 minutes,  no loss of consciousness or head trauma, pt was aware of the entire incident. Pt acknowledges she did not eat anything in the morning. Pt denies feeling numbness, weakness, tingling, chest pain, palpitation, or going for urination or defecation before the episode. Pt states she drinks lots of fluids and she had similar episode one month back but she did not fell down at that time.  Pt also states sometimes she feels chest pain around epigastric region which she thinks from esophagitis, burning type of pain, 7/10 on intensity, non radiating, aggravated by food, not associated with exercise.     In the ED vitals are stable, EKG: NSR, septal infract age undermined with 78 BPM, Had CBC and BMP grossly unremarkable except for elevated protein. Pt had CT head negative for acute stroke and also had CT lumbar and spine which showed 1) Age indeterminate compression fracture deformities of the T9, T10 and L2 vertebral bodies. 2) Indeterminate exophytic slightly high attenuation left renal midpole lesion which could represent a hemorrhagic cyst versus a renal mass.

## 2018-11-14 NOTE — PROGRESS NOTE ADULT - PROBLEM SELECTOR PLAN 1
- tylenol 650 mg q 6 changed to prn now   - lidocaine patch daily  - Gabapentin 100mg q hs is pt agrees   - no ndsaids hx of esophagitis/gastritis

## 2018-11-14 NOTE — PROGRESS NOTE ADULT - ASSESSMENT
89 year old [id number] with PMHx HTN, osteoporosis and esophagitis presented to hospital falling after a near syncope episode and chest pain will admit her for syncope work up and ACS r/o.
89 year old [id number] with PMHx HTN, osteoporosis and esophagitis presented to hospital falling after a near syncope episode and chest pain will admit her for syncope work up and ACS r/o.
89 year old female with PMHx HTN, osteoporosis and esophagitis presented to hospital falling after a near syncope episode and chest pain will admit her for syncope work up and ACS r/o.

## 2018-11-14 NOTE — PROGRESS NOTE ADULT - SUBJECTIVE AND OBJECTIVE BOX
NP Note discussed with  Primary Attending    Patient is a 89y old  Female who presents with a chief complaint of near syncope (12 Nov 2018 15:45)  Patient admitted sp fall, + age indeterminate compression fracture deformities of the T9, T10 and L2 vertebral bodies.   Patient seen at bedside sitting in chair, NAD, co back pain that is partially controlled with tylenol. Pt states pain is still at 6/10, throbbing.    States does not want to take any new medications because of side effect and refused to take gabapentin 100 mg last night. Also advised not to take NSAIDs because of chronic esophagitis.   Pt co intermittent dyspnea yesterday Reports episode last evening during arterial doppler Medical team recommended CT chest to ro PE but pt is refusing now. Pt refused blood work this morning as well.   Pt denies numbness, tingling, focal weakness, fever, bowel or bladder habits changes. VS stable, no tachycardia or hypoxia       INTERVAL HPI/OVERNIGHT EVENTS: no new complaints    MEDICATIONS  (STANDING):  acetaminophen   Tablet .. 650 milliGRAM(s) Oral every 6 hours  amLODIPine   Tablet 5 milliGRAM(s) Oral daily  aspirin enteric coated 81 milliGRAM(s) Oral daily  gabapentin 100 milliGRAM(s) Oral at bedtime  heparin  Infusion.  Unit(s)/Hr (11 mL/Hr) IV Continuous <Continuous>  lidocaine   Patch 1 Patch Transdermal daily  losartan 50 milliGRAM(s) Oral daily  pantoprazole    Tablet 40 milliGRAM(s) Oral before breakfast    MEDICATIONS  (PRN):      __________________________________________________  REVIEW OF SYSTEMS:    CONSTITUTIONAL: No fever,   EYES: no acute visual disturbances  NECK: No pain or stiffness  RESPIRATORY: No cough; + intermittent  shortness of breath  CARDIOVASCULAR: No chest pain, no palpitations  GASTROINTESTINAL: No pain. No nausea or vomiting; No diarrhea   NEUROLOGICAL: No headache or numbness, no tremors  MUSCULOSKELETAL: + lower and mid back pain   GENITOURINARY: no dysuria, no frequency, no hesitancy  PSYCHIATRY: no depression , no anxiety  ALL OTHER  ROS negative        Vital Signs Last 24 Hrs  T(C): 36.5 (13 Nov 2018 05:11), Max: 37.1 (12 Nov 2018 17:47)  T(F): 97.7 (13 Nov 2018 05:11), Max: 98.7 (12 Nov 2018 17:47)  HR: 81 (13 Nov 2018 05:11) (68 - 81)  BP: 175/74 (13 Nov 2018 05:11) (146/65 - 175/74)  BP(mean): --  RR: 18 (13 Nov 2018 05:11) (16 - 18)  SpO2: 97% (13 Nov 2018 05:11) (96% - 98%)    ________________________________________________  PHYSICAL EXAM:  GENERAL: NAD, ambulatory,   CHEST/LUNG: Clear to auscultation bilaterally with good air entry   HEART: S1 S2  regular; no murmurs, gallops or rubs  ABDOMEN: Soft, Nontender, Nondistended; Bowel sounds present  EXTREMITIES: no cyanosis; no edema; no calf tenderness  MSK minimal ttp to thoracic vertebras    SKIN: warm and dry; no rash  NERVOUS SYSTEM:  Awake and alert; Oriented  to place, person and time ; no new deficits    _________________________________________________  LABS:                        13.2   9.4   )-----------( 291      ( 11 Nov 2018 19:11 )             39.1     11-11    137  |  104  |  21<H>  ----------------------------<  106<H>  4.9   |  23  |  1.03    Ca    9.4      11 Nov 2018 19:11  Mg     2.4     11-11    TPro  9.3<H>  /  Alb  3.8  /  TBili  0.2  /  DBili  x   /  AST  35  /  ALT  22  /  AlkPhos  103  11-11    PT/INR - ( 11 Nov 2018 19:11 )   PT: 11.7 sec;   INR: 1.05 ratio         PTT - ( 11 Nov 2018 19:11 )  PTT:27.7 sec    CAPILLARY BLOOD GLUCOSE            RADIOLOGY & ADDITIONAL TESTS:    Imaging Personally Reviewed:  YES/NO    Consultant(s) Notes Reviewed:   YES/ No    Care Discussed with Consultants :     Plan of care was discussed with patient and /or primary care giver; all questions and concerns were addressed and care was aligned with patient's wishes.
NP Note discussed with  primary attending    Patient is a 89y old  Female who presents with a chief complaint of near syncope (13 Nov 2018 18:36)  Patient admitted sp fall, + age indeterminate compression fracture deformities of the T9, T10 and L2 vertebral bodies.   CT chest showed no acute findings.   Patient seen at bedside sitting in chair, NAD, states back pain improved and she is feeling better. Pt co constipation, states feels distended and bloated. States was given enema this morning, had BM but incomplete. Last time she had BM was 5 days ago.   Denies nausea, vomiting, anorexia, fever.      INTERVAL HPI/OVERNIGHT EVENTS: feeling bloated, + constipation     MEDICATIONS  (STANDING):  amLODIPine   Tablet 5 milliGRAM(s) Oral daily  aspirin enteric coated 81 milliGRAM(s) Oral daily  docusate sodium 100 milliGRAM(s) Oral two times a day  gabapentin 100 milliGRAM(s) Oral at bedtime  lidocaine   Patch 1 Patch Transdermal daily  losartan 50 milliGRAM(s) Oral daily  pantoprazole    Tablet 40 milliGRAM(s) Oral before breakfast  senna 2 Tablet(s) Oral at bedtime    MEDICATIONS  (PRN):  acetaminophen   Tablet .. 650 milliGRAM(s) Oral every 6 hours PRN Moderate Pain (4 - 6)      __________________________________________________  REVIEW OF SYSTEMS:    CONSTITUTIONAL: No fever,   EYES: no acute visual disturbances  NECK: No pain or stiffness  RESPIRATORY: No cough; No shortness of breath  CARDIOVASCULAR: No chest pain, no palpitations  GASTROINTESTINAL: + abd bloating, fullness, + constipation. No nausea or vomiting.   NEUROLOGICAL: No headache or numbness, no tremors  MUSCULOSKELETAL: + back pain, improved   GENITOURINARY: no dysuria, no frequency, no hesitancy  PSYCHIATRY: no depression , no anxiety  ALL OTHER  ROS negative        Vital Signs Last 24 Hrs  T(C): 36.6 (14 Nov 2018 04:27), Max: 36.9 (13 Nov 2018 14:20)  T(F): 97.9 (14 Nov 2018 04:27), Max: 98.5 (13 Nov 2018 14:20)  HR: 74 (14 Nov 2018 04:27) (74 - 80)  BP: 142/67 (14 Nov 2018 04:27) (142/67 - 154/59)  BP(mean): --  RR: 17 (14 Nov 2018 04:27) (17 - 17)  SpO2: 96% (14 Nov 2018 04:27) (96% - 99%)    ________________________________________________  PHYSICAL EXAM:  GENERAL: NAD, well appearing   CHEST/LUNG: Clear   HEART: S1 S2  regular; no murmurs, gallops or rubs  ABDOMEN: Soft, Nontender, + mildly distended   EXTREMITIES: no cyanosis; no edema; no calf tenderness  SKIN: warm and dry; no rash  NERVOUS SYSTEM:  Awake and alert; Oriented  to place, person and time ; no new deficits    _________________________________________________  LABS:              CAPILLARY BLOOD GLUCOSE            RADIOLOGY & ADDITIONAL TESTS:    Imaging Personally Reviewed:  YES/NO    Consultant(s) Notes Reviewed:   YES/ No    Care Discussed with Consultants :     Plan of care was discussed with patient and /or primary care giver; all questions and concerns were addressed and care was aligned with patient's wishes.
PGY 1 Note discussed with supervising resident and primary attending    Patient is a 89y old  Female who presents with a chief complaint of near syncope (12 Nov 2018 09:10)      INTERVAL HPI/OVERNIGHT EVENTS: Pt is ambulating independently.    MEDICATIONS  (STANDING):  amLODIPine   Tablet 5 milliGRAM(s) Oral daily  aspirin enteric coated 81 milliGRAM(s) Oral daily  atorvastatin 40 milliGRAM(s) Oral at bedtime  enoxaparin Injectable 40 milliGRAM(s) SubCutaneous daily  lidocaine   Patch 1 Patch Transdermal daily  metoprolol tartrate 12.5 milliGRAM(s) Oral two times a day  pantoprazole    Tablet 40 milliGRAM(s) Oral before breakfast    MEDICATIONS  (PRN):      __________________________________________________  REVIEW OF SYSTEMS:   Denies nausea, vomiting, diarrhea, abdominal pain, chest pain, palpitations, dizziness, headache, cough, wheezing, joint pain or swelling, fever, chills.   D         Vital Signs Last 24 Hrs  T(C): 36.5 (12 Nov 2018 05:18), Max: 36.7 (11 Nov 2018 21:06)  T(F): 97.7 (12 Nov 2018 05:18), Max: 98 (11 Nov 2018 21:06)  HR: 70 (12 Nov 2018 10:38) (69 - 88)  BP: 149/65 (12 Nov 2018 10:38) (147/63 - 165/71)  BP(mean): --  RR: 18 (12 Nov 2018 05:18) (17 - 18)  SpO2: 97% (12 Nov 2018 10:38) (95% - 98%)    ________________________________________________  PHYSICAL EXAM:  GENERAL: NAD  HEENT: Normocephalic;  conjunctivae and sclerae clear; moist mucous membranes;   NECK : supple  CHEST/LUNG: Clear to auscultation bilaterally with good air entry   HEART: S1 S2  regular; no murmurs, gallops or rubs  ABDOMEN: Soft, Nontender, Nondistended; Bowel sounds present  EXTREMITIES: no cyanosis; no edema; no calf tenderness  SKIN: warm and dry; no rash  NERVOUS SYSTEM:  Awake and alert; Oriented  to place, person and time ; no new deficits    _________________________________________________  LABS:                        13.2   9.4   )-----------( 291      ( 11 Nov 2018 19:11 )             39.1     11-11    137  |  104  |  21<H>  ----------------------------<  106<H>  4.9   |  23  |  1.03    Ca    9.4      11 Nov 2018 19:11  Mg     2.4     11-11    TPro  9.3<H>  /  Alb  3.8  /  TBili  0.2  /  DBili  x   /  AST  35  /  ALT  22  /  AlkPhos  103  11-11    PT/INR - ( 11 Nov 2018 19:11 )   PT: 11.7 sec;   INR: 1.05 ratio         PTT - ( 11 Nov 2018 19:11 )  PTT:27.7 sec    CAPILLARY BLOOD GLUCOSE
PGY 1 Note discussed with supervising resident and primary attending    Patient is a 89y old  Female who presents with a chief complaint of near syncope (13 Nov 2018 09:12)      INTERVAL HPI/OVERNIGHT EVENTS: Patient is complaining of shortness of breath and constipation.    MEDICATIONS  (STANDING):  acetaminophen   Tablet .. 650 milliGRAM(s) Oral every 6 hours  amLODIPine   Tablet 5 milliGRAM(s) Oral daily  aspirin enteric coated 81 milliGRAM(s) Oral daily  docusate sodium 100 milliGRAM(s) Oral two times a day  gabapentin 100 milliGRAM(s) Oral at bedtime  lactulose Syrup 15 Gram(s) Oral once  lidocaine   Patch 1 Patch Transdermal daily  losartan 50 milliGRAM(s) Oral daily  pantoprazole    Tablet 40 milliGRAM(s) Oral before breakfast  senna 2 Tablet(s) Oral at bedtime    MEDICATIONS  (PRN):      __________________________________________________  REVIEW OF SYSTEMS:   Denies nausea, vomiting, diarrhea, abdominal pain, chest pain, palpitations, dizziness, headache, cough, wheezing, joint pain or swelling, fever, chills.   D          Vital Signs Last 24 Hrs  T(C): 36.9 (13 Nov 2018 14:20), Max: 37.1 (12 Nov 2018 17:47)  T(F): 98.5 (13 Nov 2018 14:20), Max: 98.7 (12 Nov 2018 17:47)  HR: 80 (13 Nov 2018 14:20) (68 - 81)  BP: 149/69 (13 Nov 2018 14:20) (146/65 - 175/74)  BP(mean): --  RR: 17 (13 Nov 2018 14:20) (16 - 18)  SpO2: 99% (13 Nov 2018 14:20) (96% - 99%)    ________________________________________________  PHYSICAL EXAM:  GENERAL: NAD  HEENT: Normocephalic;  conjunctivae and sclerae clear; moist mucous membranes;   NECK : supple  CHEST/LUNG: Clear to auscultation bilaterally with good air entry   HEART: S1 S2  regular; no murmurs, gallops or rubs  ABDOMEN: Soft, Nontender, distended; Bowel sounds present  EXTREMITIES: no cyanosis; no edema; no calf tenderness  SKIN: warm and dry; no rash  NERVOUS SYSTEM:  Awake and alert; Oriented  to place, person and time ; no new deficits    _________________________________________________  LABS:                        13.2   9.4   )-----------( 291      ( 11 Nov 2018 19:11 )             39.1     11-11    137  |  104  |  21<H>  ----------------------------<  106<H>  4.9   |  23  |  1.03    Ca    9.4      11 Nov 2018 19:11  Mg     2.4     11-11    TPro  9.3<H>  /  Alb  3.8  /  TBili  0.2  /  DBili  x   /  AST  35  /  ALT  22  /  AlkPhos  103  11-11    PT/INR - ( 11 Nov 2018 19:11 )   PT: 11.7 sec;   INR: 1.05 ratio         PTT - ( 11 Nov 2018 19:11 )  PTT:27.7 sec    CAPILLARY BLOOD GLUCOSE
PGY 1 Note discussed with supervising resident and primary attending    Patient is a 89y old  Female who presents with a chief complaint of near syncope (14 Nov 2018 13:38)      INTERVAL HPI/OVERNIGHT EVENTS: Patient is complaining of epigastric pain.    MEDICATIONS  (STANDING):  amLODIPine   Tablet 5 milliGRAM(s) Oral daily  aspirin enteric coated 81 milliGRAM(s) Oral daily  docusate sodium 100 milliGRAM(s) Oral two times a day  gabapentin 100 milliGRAM(s) Oral at bedtime  lidocaine   Patch 1 Patch Transdermal daily  losartan 50 milliGRAM(s) Oral daily  pantoprazole    Tablet 40 milliGRAM(s) Oral before breakfast  senna 2 Tablet(s) Oral at bedtime    MEDICATIONS  (PRN):  acetaminophen   Tablet .. 650 milliGRAM(s) Oral every 6 hours PRN Moderate Pain (4 - 6)  polyethylene glycol 3350 17 Gram(s) Oral two times a day PRN Constipation      __________________________________________________  REVIEW OF SYSTEMS:   Denies nausea, vomiting, diarrhea, abdominal pain, chest pain, palpitations, dizziness, headache, cough, wheezing, joint pain or swelling, fever, chills.   D        Vital Signs Last 24 Hrs  T(C): 36.5 (14 Nov 2018 13:58), Max: 36.9 (13 Nov 2018 21:20)  T(F): 97.7 (14 Nov 2018 13:58), Max: 98.4 (13 Nov 2018 21:20)  HR: 85 (14 Nov 2018 13:58) (74 - 85)  BP: 139/54 (14 Nov 2018 13:58) (139/54 - 154/59)  BP(mean): --  RR: 16 (14 Nov 2018 13:58) (16 - 17)  SpO2: 95% (14 Nov 2018 13:58) (95% - 96%)    ________________________________________________  PHYSICAL EXAM:  GENERAL: NAD  HEENT: Normocephalic;  conjunctivae and sclerae clear; moist mucous membranes;   NECK : supple  CHEST/LUNG: Clear to auscultation bilaterally with good air entry   HEART: S1 S2  regular; no murmurs, gallops or rubs  ABDOMEN: Soft, tender in epigastric re, Nondistended; Bowel sounds present  EXTREMITIES: no cyanosis; no edema; no calf tenderness  SKIN: warm and dry; no rash  NERVOUS SYSTEM:  Awake and alert; Oriented  to place, person and time ; no new deficits    _________________________________________________  LABS:              CAPILLARY BLOOD GLUCOSE

## 2018-11-14 NOTE — DISCHARGE NOTE ADULT - CARE PLAN
Principal Discharge DX:	Fall, initial encounter  Goal:	Prevention of falls  Assessment and plan of treatment:	You came here with  a fall and ct scan showed vertebral compression fracture. Pain was managed with tylenol, lidocaine and she is refusing gabapentin due to side effects. Follow up with your Primary medical doctor and continue the same medication regimen.  Secondary Diagnosis:	Back pain, chronic  Assessment and plan of treatment:	You came here with  a fall and ct scan showed vertebral compression fracture. Pain was managed with tylenol, lidocaine and she is refusing gabapentin due to side effects.  Secondary Diagnosis:	HTN (hypertension)  Assessment and plan of treatment:	You have a history of hypertension. Your blood pressure has been controlled. Continue with your medications as prescribed. You are advised to eat DASH diet. Please monitor your blood pressure daily, record it and take it to your primary doctor. Follow up with your Primary medical doctor.  Secondary Diagnosis:	Shortness of breath  Assessment and plan of treatment:	You complained of shortness of breath and ct angiogram was negative for Pulmonary embolism. Cardiology work up was negative. EKG showed NSR. Troponins were negative. ECHO was normal with EF > 55%. Follow up with your Primary medical doctor.

## 2018-11-14 NOTE — PROGRESS NOTE ADULT - PROBLEM SELECTOR PLAN 1
ct angiogram of chest is negative and r/o Pulmonary embolism. ct angiogram of chest is negative for pulmonary embolism.

## 2018-11-14 NOTE — PROGRESS NOTE ADULT - PROBLEM SELECTOR PLAN 6
pt is refusing gabapentin   out pt follow up with PCP for osteoporosis
Compression fracture of T9, T10 and L2.    pt is refusing gabapentin and on tylenol.  out pt follow up with PCP for osteoporosis
Will get serum and protein electrophoresis and will r/o viral infection

## 2018-11-14 NOTE — PROGRESS NOTE ADULT - PROBLEM SELECTOR PLAN 8
VTE score 1   Because of old age will start lovenox for DVT ppx.
VTE score 1   Because of old age will start lovenox for DVT ppx.

## 2018-11-14 NOTE — DISCHARGE NOTE ADULT - PATIENT PORTAL LINK FT
You can access the [facility] Patient Portal, offered by [facility], by registering with the following website: [email or web address]

## 2018-11-14 NOTE — DISCHARGE NOTE ADULT - MEDICATION SUMMARY - MEDICATIONS TO TAKE
I will START or STAY ON the medications listed below when I get home from the hospital:    aspirin 81 mg oral delayed release tablet  -- 1 tab(s) by mouth once a day  -- Indication: For Cardioprotective    losartan 50 mg oral tablet  -- 1 tab(s) by mouth once a day  -- Indication: For Hypertension    gabapentin 100 mg oral capsule  -- 1 cap(s) by mouth once a day (at bedtime)  -- Indication: For Back pain    amLODIPine 5 mg oral tablet  -- 1 tab(s) by mouth once a day  -- Indication: For Hypertension    lidocaine 5% topical film  -- Apply on skin to affected area once a day  -- Indication: For Back pain    pantoprazole 40 mg oral delayed release tablet  -- 1 tab(s) by mouth once a day (before a meal)  -- Indication: For GERD (gastroesophageal reflux disease)    Calcium 500+D  -- 1 tab(s) by mouth once a day  -- Indication: For Nutritional support

## 2018-11-15 VITALS — SYSTOLIC BLOOD PRESSURE: 166 MMHG | HEART RATE: 80 BPM | DIASTOLIC BLOOD PRESSURE: 73 MMHG

## 2018-11-15 LAB
% GAMMA, URINE: 14.2 % — SIGNIFICANT CHANGE UP
ALBUMIN 24H MFR UR ELPH: 57 % — SIGNIFICANT CHANGE UP
ALPHA1 GLOB 24H MFR UR ELPH: 13.7 % — SIGNIFICANT CHANGE UP
ALPHA2 GLOB 24H MFR UR ELPH: 10.7 % — SIGNIFICANT CHANGE UP
B-GLOBULIN 24H MFR UR ELPH: 4.4 % — SIGNIFICANT CHANGE UP
INTERPRETATION 24H UR IFE-IMP: SIGNIFICANT CHANGE UP
M PROTEIN 24H UR ELPH-MRATE: SIGNIFICANT CHANGE UP
PROT ?TM UR-MCNC: 4 MG/DL — SIGNIFICANT CHANGE UP (ref 0–12)
PROT PATTERN 24H UR ELPH-IMP: SIGNIFICANT CHANGE UP
TOTAL VOLUME - 24 HOUR: SIGNIFICANT CHANGE UP
URINE CREATININE CALCULATION: SIGNIFICANT CHANGE UP G/24 H (ref 0.8–1.8)

## 2018-11-15 PROCEDURE — 71045 X-RAY EXAM CHEST 1 VIEW: CPT

## 2018-11-15 PROCEDURE — 83735 ASSAY OF MAGNESIUM: CPT

## 2018-11-15 PROCEDURE — 85730 THROMBOPLASTIN TIME PARTIAL: CPT

## 2018-11-15 PROCEDURE — 85027 COMPLETE CBC AUTOMATED: CPT

## 2018-11-15 PROCEDURE — 76770 US EXAM ABDO BACK WALL COMP: CPT

## 2018-11-15 PROCEDURE — 72131 CT LUMBAR SPINE W/O DYE: CPT

## 2018-11-15 PROCEDURE — 93306 TTE W/DOPPLER COMPLETE: CPT

## 2018-11-15 PROCEDURE — 71275 CT ANGIOGRAPHY CHEST: CPT

## 2018-11-15 PROCEDURE — 97162 PT EVAL MOD COMPLEX 30 MIN: CPT

## 2018-11-15 PROCEDURE — 84166 PROTEIN E-PHORESIS/URINE/CSF: CPT

## 2018-11-15 PROCEDURE — 93005 ELECTROCARDIOGRAM TRACING: CPT

## 2018-11-15 PROCEDURE — 82550 ASSAY OF CK (CPK): CPT

## 2018-11-15 PROCEDURE — 99285 EMERGENCY DEPT VISIT HI MDM: CPT | Mod: 25

## 2018-11-15 PROCEDURE — 72128 CT CHEST SPINE W/O DYE: CPT

## 2018-11-15 PROCEDURE — 84484 ASSAY OF TROPONIN QUANT: CPT

## 2018-11-15 PROCEDURE — 80053 COMPREHEN METABOLIC PANEL: CPT

## 2018-11-15 PROCEDURE — 93880 EXTRACRANIAL BILAT STUDY: CPT

## 2018-11-15 PROCEDURE — 97530 THERAPEUTIC ACTIVITIES: CPT

## 2018-11-15 PROCEDURE — 97116 GAIT TRAINING THERAPY: CPT

## 2018-11-15 PROCEDURE — 85610 PROTHROMBIN TIME: CPT

## 2018-11-15 PROCEDURE — 70450 CT HEAD/BRAIN W/O DYE: CPT

## 2018-11-15 RX ORDER — GABAPENTIN 400 MG/1
1 CAPSULE ORAL
Qty: 30 | Refills: 0 | OUTPATIENT
Start: 2018-11-15 | End: 2018-12-14

## 2018-11-15 RX ORDER — LOSARTAN POTASSIUM 100 MG/1
1 TABLET, FILM COATED ORAL
Qty: 30 | Refills: 0 | OUTPATIENT
Start: 2018-11-15 | End: 2018-12-14

## 2018-11-15 RX ADMIN — LIDOCAINE 1 PATCH: 4 CREAM TOPICAL at 01:54

## 2018-11-15 RX ADMIN — Medication 650 MILLIGRAM(S): at 06:00

## 2018-11-15 RX ADMIN — PANTOPRAZOLE SODIUM 40 MILLIGRAM(S): 20 TABLET, DELAYED RELEASE ORAL at 07:20

## 2018-11-15 RX ADMIN — AMLODIPINE BESYLATE 5 MILLIGRAM(S): 2.5 TABLET ORAL at 07:20

## 2018-11-15 RX ADMIN — Medication 650 MILLIGRAM(S): at 05:05

## 2018-11-15 RX ADMIN — LOSARTAN POTASSIUM 50 MILLIGRAM(S): 100 TABLET, FILM COATED ORAL at 07:20

## 2018-11-15 RX ADMIN — Medication 100 MILLIGRAM(S): at 05:07

## 2020-04-17 NOTE — PHYSICAL THERAPY INITIAL EVALUATION ADULT - PLANNED THERAPY INTERVENTIONS, PT EVAL
strengthening/transfer training/postural re-education/balance training/bed mobility training/gait training/ROM
The patient is a 64y Male complaining of

## 2021-06-09 NOTE — DISCHARGE NOTE ADULT - NSFTFAASSIST2FT_GEN_ALL_CORE
Unresponsive - The patient is nonverbal and does not respond even when a painful stimulus is applied. compression fractures

## 2024-06-21 NOTE — CONSULT NOTE ADULT - CARDIOVASCULAR
Health Maintenance Due   Topic Date Due    Hepatitis C Screening  Never done    HIV Screening  Never done    TETANUS VACCINE  Never done    Mammogram  Never done    Colorectal Cancer Screening  Never done    COVID-19 Vaccine (3 - 2023-24 season) 09/01/2023    Shingles Vaccine (1 of 2) Never done     Recent labs reviewed and discussed.  Shingrix recommended.  But she doesn't think she ever had chicken pox, and she's beenvaccinated for chicken pox.   negative Regular rate & rhythm, normal S1, S2; no murmurs, gallops or rubs; no S3, S4

## 2024-12-10 ENCOUNTER — EMERGENCY (EMERGENCY)
Facility: HOSPITAL | Age: 88
LOS: 1 days | Discharge: HOME | End: 2024-12-10
Attending: STUDENT IN AN ORGANIZED HEALTH CARE EDUCATION/TRAINING PROGRAM
Payer: MEDICARE

## 2024-12-10 VITALS
TEMPERATURE: 98 F | WEIGHT: 112.44 LBS | DIASTOLIC BLOOD PRESSURE: 78 MMHG | SYSTOLIC BLOOD PRESSURE: 150 MMHG | RESPIRATION RATE: 20 BRPM | HEIGHT: 55 IN | OXYGEN SATURATION: 95 % | HEART RATE: 86 BPM

## 2024-12-10 VITALS
RESPIRATION RATE: 18 BRPM | OXYGEN SATURATION: 99 % | DIASTOLIC BLOOD PRESSURE: 68 MMHG | TEMPERATURE: 98 F | HEART RATE: 78 BPM | SYSTOLIC BLOOD PRESSURE: 145 MMHG

## 2024-12-10 DIAGNOSIS — Z98.890 OTHER SPECIFIED POSTPROCEDURAL STATES: Chronic | ICD-10-CM

## 2024-12-10 PROBLEM — M81.0 AGE-RELATED OSTEOPOROSIS WITHOUT CURRENT PATHOLOGICAL FRACTURE: Chronic | Status: ACTIVE | Noted: 2018-11-11

## 2024-12-10 PROBLEM — I10 ESSENTIAL (PRIMARY) HYPERTENSION: Chronic | Status: ACTIVE | Noted: 2018-11-11

## 2024-12-10 PROBLEM — K20.9 ESOPHAGITIS, UNSPECIFIED: Chronic | Status: ACTIVE | Noted: 2018-11-12

## 2024-12-10 LAB
ALBUMIN SERPL ELPH-MCNC: 3.8 G/DL — SIGNIFICANT CHANGE UP (ref 3.5–5)
ALP SERPL-CCNC: 88 U/L — SIGNIFICANT CHANGE UP (ref 40–120)
ALT FLD-CCNC: 29 U/L DA — SIGNIFICANT CHANGE UP (ref 10–60)
ANION GAP SERPL CALC-SCNC: 1 MMOL/L — LOW (ref 5–17)
APTT BLD: 28 SEC — SIGNIFICANT CHANGE UP (ref 24.5–35.6)
AST SERPL-CCNC: 28 U/L — SIGNIFICANT CHANGE UP (ref 10–40)
BASOPHILS # BLD AUTO: 0.02 K/UL — SIGNIFICANT CHANGE UP (ref 0–0.2)
BASOPHILS NFR BLD AUTO: 0.4 % — SIGNIFICANT CHANGE UP (ref 0–2)
BILIRUB SERPL-MCNC: 0.2 MG/DL — SIGNIFICANT CHANGE UP (ref 0.2–1.2)
BUN SERPL-MCNC: 16 MG/DL — SIGNIFICANT CHANGE UP (ref 7–18)
CALCIUM SERPL-MCNC: 8.6 MG/DL — SIGNIFICANT CHANGE UP (ref 8.4–10.5)
CHLORIDE SERPL-SCNC: 112 MMOL/L — HIGH (ref 96–108)
CO2 SERPL-SCNC: 29 MMOL/L — SIGNIFICANT CHANGE UP (ref 22–31)
CREAT SERPL-MCNC: 0.59 MG/DL — SIGNIFICANT CHANGE UP (ref 0.5–1.3)
D DIMER BLD IA.RAPID-MCNC: 232 NG/ML DDU — HIGH
EGFR: 83 ML/MIN/1.73M2 — SIGNIFICANT CHANGE UP
EOSINOPHIL # BLD AUTO: 0.11 K/UL — SIGNIFICANT CHANGE UP (ref 0–0.5)
EOSINOPHIL NFR BLD AUTO: 2.3 % — SIGNIFICANT CHANGE UP (ref 0–6)
GLUCOSE SERPL-MCNC: 103 MG/DL — HIGH (ref 70–99)
HCT VFR BLD CALC: 34.1 % — LOW (ref 34.5–45)
HGB BLD-MCNC: 11.3 G/DL — LOW (ref 11.5–15.5)
IMM GRANULOCYTES NFR BLD AUTO: 0.4 % — SIGNIFICANT CHANGE UP (ref 0–0.9)
INR BLD: 0.93 RATIO — SIGNIFICANT CHANGE UP (ref 0.85–1.16)
LIDOCAIN IGE QN: 61 U/L — SIGNIFICANT CHANGE UP (ref 13–75)
LYMPHOCYTES # BLD AUTO: 1.17 K/UL — SIGNIFICANT CHANGE UP (ref 1–3.3)
LYMPHOCYTES # BLD AUTO: 24.2 % — SIGNIFICANT CHANGE UP (ref 13–44)
MCHC RBC-ENTMCNC: 30.9 PG — SIGNIFICANT CHANGE UP (ref 27–34)
MCHC RBC-ENTMCNC: 33.1 G/DL — SIGNIFICANT CHANGE UP (ref 32–36)
MCV RBC AUTO: 93.2 FL — SIGNIFICANT CHANGE UP (ref 80–100)
MONOCYTES # BLD AUTO: 0.58 K/UL — SIGNIFICANT CHANGE UP (ref 0–0.9)
MONOCYTES NFR BLD AUTO: 12 % — SIGNIFICANT CHANGE UP (ref 2–14)
NEUTROPHILS # BLD AUTO: 2.93 K/UL — SIGNIFICANT CHANGE UP (ref 1.8–7.4)
NEUTROPHILS NFR BLD AUTO: 60.7 % — SIGNIFICANT CHANGE UP (ref 43–77)
NRBC # BLD: 0 /100 WBCS — SIGNIFICANT CHANGE UP (ref 0–0)
PLATELET # BLD AUTO: 221 K/UL — SIGNIFICANT CHANGE UP (ref 150–400)
POTASSIUM SERPL-MCNC: 4.2 MMOL/L — SIGNIFICANT CHANGE UP (ref 3.5–5.3)
POTASSIUM SERPL-SCNC: 4.2 MMOL/L — SIGNIFICANT CHANGE UP (ref 3.5–5.3)
PROT SERPL-MCNC: 8 G/DL — SIGNIFICANT CHANGE UP (ref 6–8.3)
PROTHROM AB SERPL-ACNC: 10.9 SEC — SIGNIFICANT CHANGE UP (ref 9.9–13.4)
RBC # BLD: 3.66 M/UL — LOW (ref 3.8–5.2)
RBC # FLD: 15 % — HIGH (ref 10.3–14.5)
SODIUM SERPL-SCNC: 142 MMOL/L — SIGNIFICANT CHANGE UP (ref 135–145)
TROPONIN I, HIGH SENSITIVITY RESULT: 8.2 NG/L — SIGNIFICANT CHANGE UP
TROPONIN I, HIGH SENSITIVITY RESULT: 9 NG/L — SIGNIFICANT CHANGE UP
WBC # BLD: 4.83 K/UL — SIGNIFICANT CHANGE UP (ref 3.8–10.5)
WBC # FLD AUTO: 4.83 K/UL — SIGNIFICANT CHANGE UP (ref 3.8–10.5)

## 2024-12-10 PROCEDURE — 96374 THER/PROPH/DIAG INJ IV PUSH: CPT

## 2024-12-10 PROCEDURE — 85379 FIBRIN DEGRADATION QUANT: CPT

## 2024-12-10 PROCEDURE — 99285 EMERGENCY DEPT VISIT HI MDM: CPT | Mod: 25

## 2024-12-10 PROCEDURE — 99285 EMERGENCY DEPT VISIT HI MDM: CPT

## 2024-12-10 PROCEDURE — 83690 ASSAY OF LIPASE: CPT

## 2024-12-10 PROCEDURE — 85610 PROTHROMBIN TIME: CPT

## 2024-12-10 PROCEDURE — 85025 COMPLETE CBC W/AUTO DIFF WBC: CPT

## 2024-12-10 PROCEDURE — 85730 THROMBOPLASTIN TIME PARTIAL: CPT

## 2024-12-10 PROCEDURE — 71045 X-RAY EXAM CHEST 1 VIEW: CPT | Mod: 26

## 2024-12-10 PROCEDURE — 80053 COMPREHEN METABOLIC PANEL: CPT

## 2024-12-10 PROCEDURE — 84484 ASSAY OF TROPONIN QUANT: CPT

## 2024-12-10 PROCEDURE — 93005 ELECTROCARDIOGRAM TRACING: CPT

## 2024-12-10 PROCEDURE — 71045 X-RAY EXAM CHEST 1 VIEW: CPT

## 2024-12-10 PROCEDURE — 36415 COLL VENOUS BLD VENIPUNCTURE: CPT

## 2024-12-10 RX ORDER — FAMOTIDINE 20 MG/1
20 TABLET, FILM COATED ORAL ONCE
Refills: 0 | Status: COMPLETED | OUTPATIENT
Start: 2024-12-10 | End: 2024-12-10

## 2024-12-10 RX ORDER — MAGNESIUM, ALUMINUM HYDROXIDE 200-225/5
30 SUSPENSION, ORAL (FINAL DOSE FORM) ORAL ONCE
Refills: 0 | Status: COMPLETED | OUTPATIENT
Start: 2024-12-10 | End: 2024-12-10

## 2024-12-10 RX ADMIN — FAMOTIDINE 20 MILLIGRAM(S): 20 TABLET, FILM COATED ORAL at 10:25

## 2024-12-10 RX ADMIN — Medication 30 MILLILITER(S): at 10:24

## 2024-12-10 NOTE — ED ADULT NURSE NOTE - NSFALLHARMRISKINTERV_ED_ALL_ED
Assistance OOB with selected safe patient handling equipment if applicable/Assistance with ambulation/Communicate risk of Fall with Harm to all staff, patient, and family/Monitor gait and stability/Provide patient with walking aids/Provide visual cue: red socks, yellow wristband, yellow gown, etc/Reinforce activity limits and safety measures with patient and family/Use of alarms - bed, stretcher, chair and/or video monitoring/Bed in lowest position, wheels locked, appropriate side rails in place/Call bell, personal items and telephone in reach/Instruct patient to call for assistance before getting out of bed/chair/stretcher/Non-slip footwear applied when patient is off stretcher/[place] to call system/Physically safe environment - no spills, clutter or unnecessary equipment/Purposeful Proactive Rounding/Room/bathroom lighting operational, light cord in reach

## 2024-12-10 NOTE — ED PROVIDER NOTE - PROGRESS NOTE DETAILS
Lucks-DO: Labs/imaging with nonactionable findings.  Pain resolved.  Patient requesting discharge.  Will DC with outpatient follow-up, return precautions, patient understood and agreeable with plan.

## 2024-12-10 NOTE — ED PROVIDER NOTE - OBJECTIVE STATEMENT
89-year-old female with past medical history hypertension, esophagitis presents with midsternal chest discomfort since last night.  Patient states she has been experiencing constant midsternal nonradiating chest discomfort over the past 10 hours.  Denies any fevers, shortness of breath, abdominal pain, vomiting, diarrhea, back pain, numbness, focal weakness, or rash.  Denies any recent injury or trauma.  Denies any known history of CAD or chest pain with exertion.  Denies any additional complaints.

## 2024-12-10 NOTE — ED ADULT NURSE REASSESSMENT NOTE - NS ED NURSE REASSESS COMMENT FT1
0910: writer communicate with pt. writer apologize to pt for delay in care d/t floor acuity. pt verbalizes understanding.

## 2024-12-10 NOTE — ED PROVIDER NOTE - CLINICAL SUMMARY MEDICAL DECISION MAKING FREE TEXT BOX
Eileen: 89-year-old female with past medical history hypertension, esophagitis presents with midsternal chest discomfort since last night.  Patient states she has been experiencing constant midsternal nonradiating chest discomfort over the past 10 hours.  Denies any fevers, shortness of breath, abdominal pain, vomiting, diarrhea, back pain, numbness, focal weakness, or rash.  Denies any recent injury or trauma.  Denies any known history of CAD or chest pain with exertion.  Physical exam per above. CP not typical for cardiac etiology, ?GERD/esophagitis. Will obtain labs r/o NSTEMI r/o PE, imaging, provide supportive treatment with dispo pending workup.

## 2024-12-10 NOTE — ED ADULT NURSE NOTE - OBJECTIVE STATEMENT
pt c/o chest pain starting 10pm 12/09/24. pt states "its not my heart, its my esophagus, I had this before".  pt hypertension. pt denies nausea/vomiting, numbness/tingling.

## 2024-12-10 NOTE — ED PROVIDER NOTE - PATIENT PORTAL LINK FT
You can access the FollowMyHealth Patient Portal offered by [facility] by registering at the following website: [email or web address]. By joining [employer]’s FollowMyHealth portal, you will also be able to view your health information using other applications (apps) compatible with our system.

## 2025-01-17 NOTE — PHYSICAL THERAPY INITIAL EVALUATION ADULT - TRANSFER SAFETY CONCERNS NOTED: BED/CHAIR, REHAB EVAL
Mackenzie per Dr. Gonzales to refill. Patient in office yesterday and had lithium level drawn.     Lithium  0.6 - 1.2 mmol/L 0.8      decreased balance during turns/decreased step length